# Patient Record
Sex: FEMALE | Race: WHITE | HISPANIC OR LATINO | Employment: FULL TIME | URBAN - METROPOLITAN AREA
[De-identification: names, ages, dates, MRNs, and addresses within clinical notes are randomized per-mention and may not be internally consistent; named-entity substitution may affect disease eponyms.]

---

## 2018-08-01 ENCOUNTER — OFFICE VISIT (OUTPATIENT)
Dept: URGENT CARE | Facility: PHYSICIAN GROUP | Age: 23
End: 2018-08-01
Payer: COMMERCIAL

## 2018-08-01 VITALS
WEIGHT: 129 LBS | SYSTOLIC BLOOD PRESSURE: 116 MMHG | TEMPERATURE: 97.2 F | DIASTOLIC BLOOD PRESSURE: 70 MMHG | OXYGEN SATURATION: 100 % | HEART RATE: 74 BPM | BODY MASS INDEX: 25.32 KG/M2 | HEIGHT: 60 IN

## 2018-08-01 DIAGNOSIS — H01.001 BLEPHARITIS OF RIGHT UPPER EYELID, UNSPECIFIED TYPE: ICD-10-CM

## 2018-08-01 PROCEDURE — 99204 OFFICE O/P NEW MOD 45 MIN: CPT | Performed by: FAMILY MEDICINE

## 2018-08-01 RX ORDER — POLYMYXIN B SULFATE AND TRIMETHOPRIM 1; 10000 MG/ML; [USP'U]/ML
1 SOLUTION OPHTHALMIC EVERY 4 HOURS
Qty: 10 ML | Refills: 0 | Status: SHIPPED | OUTPATIENT
Start: 2018-08-01 | End: 2018-10-09

## 2018-08-01 ASSESSMENT — ENCOUNTER SYMPTOMS
EYE PAIN: 0
CHILLS: 0
SHORTNESS OF BREATH: 0
MYALGIAS: 0
EYE REDNESS: 1
EYE DISCHARGE: 0
VOMITING: 0
DOUBLE VISION: 0
SORE THROAT: 0
FEVER: 0
DIZZINESS: 0
BLURRED VISION: 0
FOREIGN BODY SENSATION: 0
NAUSEA: 0
EYE ITCHING: 1

## 2018-08-01 NOTE — PROGRESS NOTES
Subjective:     Marian Dee is a 22 y.o. female who presents for Eye Problem (onset monday eyelid swelling)       Eye Problem    The right eye is affected. This is a new problem. The current episode started yesterday. The problem occurs constantly. The problem has been rapidly worsening. There was no injury mechanism. The pain is moderate. Associated symptoms include eye redness and itching. Pertinent negatives include no blurred vision, eye discharge, double vision, fever, foreign body sensation, nausea or vomiting.   History reviewed. No pertinent past medical history.History reviewed. No pertinent surgical history.  Social History     Social History   • Marital status:      Spouse name: N/A   • Number of children: N/A   • Years of education: N/A     Occupational History   • Not on file.     Social History Main Topics   • Smoking status: Never Smoker   • Smokeless tobacco: Never Used   • Alcohol use No   • Drug use: No   • Sexual activity: Yes     Partners: Male      Comment: no birth control     Other Topics Concern   • Not on file     Social History Narrative   • No narrative on file      Family History   Problem Relation Age of Onset   • Hyperlipidemia Maternal Grandmother    • Hyperlipidemia Paternal Grandmother    • Diabetes Paternal Grandmother    • Hyperlipidemia Paternal Grandfather    • Heart Disease Neg Hx    • Stroke Neg Hx     Review of Systems   Constitutional: Negative for chills and fever.   HENT: Negative for sore throat.    Eyes: Positive for redness and itching. Negative for blurred vision, double vision, pain and discharge.   Respiratory: Negative for shortness of breath.    Cardiovascular: Negative for chest pain.   Gastrointestinal: Negative for nausea and vomiting.   Genitourinary: Negative for hematuria.   Musculoskeletal: Negative for myalgias.   Skin: Negative for rash.   Neurological: Negative for dizziness.   No Known Allergies   Objective:   /70   Pulse  74   Temp 36.2 °C (97.2 °F)   Ht 1.524 m (5')   Wt 58.5 kg (129 lb)   SpO2 100%   BMI 25.19 kg/m²   Physical Exam   Constitutional: She is oriented to person, place, and time. She appears well-developed and well-nourished. No distress.   HENT:   Head: Normocephalic and atraumatic.   Eyes: Pupils are equal, round, and reactive to light. Conjunctivae and EOM are normal.   Erythematous and swollen upper eyelid right   Cardiovascular: Normal rate and regular rhythm.    No murmur heard.  Pulmonary/Chest: Effort normal and breath sounds normal. No respiratory distress.   Abdominal: Soft. She exhibits no distension. There is no tenderness.   Neurological: She is alert and oriented to person, place, and time. She has normal reflexes. No sensory deficit.   Skin: Skin is warm and dry.   Psychiatric: She has a normal mood and affect.         Assessment/Plan:   Assessment    1. Blepharitis of right upper eyelid, unspecified type  - polymixin-trimethoprim (POLYTRIM) 82762-0.1 UNIT/ML-% Solution; Place 1 Drop in both eyes every 4 hours.  Dispense: 10 mL; Refill: 0    Differential diagnosis, natural history, supportive care, and indications for immediate follow-up discussed.

## 2018-10-09 ENCOUNTER — OFFICE VISIT (OUTPATIENT)
Dept: MEDICAL GROUP | Facility: MEDICAL CENTER | Age: 23
End: 2018-10-09
Payer: COMMERCIAL

## 2018-10-09 VITALS
HEIGHT: 60 IN | RESPIRATION RATE: 16 BRPM | HEART RATE: 87 BPM | OXYGEN SATURATION: 97 % | DIASTOLIC BLOOD PRESSURE: 62 MMHG | WEIGHT: 132.2 LBS | SYSTOLIC BLOOD PRESSURE: 96 MMHG | TEMPERATURE: 97.9 F | BODY MASS INDEX: 25.95 KG/M2

## 2018-10-09 DIAGNOSIS — Z23 NEED FOR HPV VACCINATION: ICD-10-CM

## 2018-10-09 DIAGNOSIS — Z00.00 ROUTINE GENERAL MEDICAL EXAMINATION AT A HEALTH CARE FACILITY: ICD-10-CM

## 2018-10-09 DIAGNOSIS — Z00.00 LABORATORY EXAM ORDERED AS PART OF ROUTINE GENERAL MEDICAL EXAMINATION: ICD-10-CM

## 2018-10-09 PROCEDURE — 90471 IMMUNIZATION ADMIN: CPT | Performed by: FAMILY MEDICINE

## 2018-10-09 PROCEDURE — 90651 9VHPV VACCINE 2/3 DOSE IM: CPT | Performed by: FAMILY MEDICINE

## 2018-10-09 PROCEDURE — 99395 PREV VISIT EST AGE 18-39: CPT | Mod: 25 | Performed by: FAMILY MEDICINE

## 2018-10-09 ASSESSMENT — ENCOUNTER SYMPTOMS
CONSTIPATION: 0
NECK PAIN: 0
TREMORS: 0
FEVER: 0
PALPITATIONS: 0
ORTHOPNEA: 0
LOSS OF CONSCIOUSNESS: 0
WEIGHT LOSS: 0
BRUISES/BLEEDS EASILY: 0
DIARRHEA: 0
NAUSEA: 0
MYALGIAS: 0
TINGLING: 0
SORE THROAT: 0
SENSORY CHANGE: 0
CHILLS: 0
HEARTBURN: 0
VOMITING: 0
DEPRESSION: 0
SEIZURES: 0
COUGH: 0
INSOMNIA: 0
HALLUCINATIONS: 0
SPUTUM PRODUCTION: 0
MEMORY LOSS: 0
BACK PAIN: 0
FOCAL WEAKNESS: 0
BLURRED VISION: 0
BLOOD IN STOOL: 0
HEADACHES: 0
DIZZINESS: 0
SHORTNESS OF BREATH: 0
NERVOUS/ANXIOUS: 0
SPEECH CHANGE: 0
ABDOMINAL PAIN: 0
DOUBLE VISION: 0

## 2018-10-09 ASSESSMENT — LIFESTYLE VARIABLES: SUBSTANCE_ABUSE: 0

## 2018-10-10 NOTE — PROGRESS NOTES
Subjective:      Marian Dee is a 22 y.o. female who presents with New Patient and Annual Exam        She is here to establish care and for her annual examination.  Unable to perform pap smear today as she is on her period.    HPI     has a past medical history of History of hepatitis B. this was as a small child.   has no past surgical history on file.  family history includes Diabetes in her paternal grandmother; Hyperlipidemia in her maternal grandmother, paternal grandfather, and paternal grandmother.   reports that she has never smoked. She has never used smokeless tobacco. She reports that she does not drink alcohol or use drugs.    No current outpatient prescriptions on file.  has No Known Allergies.    Works as a lead .      Review of Systems   Constitutional: Negative for chills, fever, malaise/fatigue and weight loss.   HENT: Negative for congestion, hearing loss, sore throat and tinnitus.    Eyes: Negative for blurred vision and double vision.   Respiratory: Negative for cough, sputum production and shortness of breath.    Cardiovascular: Negative for chest pain, palpitations, orthopnea and leg swelling.   Gastrointestinal: Negative for abdominal pain, blood in stool, constipation, diarrhea, heartburn, nausea and vomiting.        History of gallbladder inflammation in pregnancy.     Genitourinary: Negative for dysuria, frequency, hematuria and urgency.        Cramping during her period.   Musculoskeletal: Negative for back pain, joint pain, myalgias and neck pain.   Skin: Negative for itching and rash.   Neurological: Negative for dizziness, tingling, tremors, sensory change, speech change, focal weakness, seizures, loss of consciousness and headaches.   Endo/Heme/Allergies: Negative for environmental allergies. Does not bruise/bleed easily.   Psychiatric/Behavioral: Negative for depression, hallucinations, memory loss, substance abuse and suicidal ideas. The patient is not  nervous/anxious and does not have insomnia.           Objective:     BP (!) 96/62 (BP Location: Right arm, Patient Position: Sitting, BP Cuff Size: Adult)   Pulse 87   Temp 36.6 °C (97.9 °F)   Resp 16   Ht 1.524 m (5')   Wt 60 kg (132 lb 3.2 oz)   SpO2 97%   BMI 25.82 kg/m²      Physical Exam   Constitutional: She is oriented to person, place, and time. She appears well-developed and well-nourished.   HENT:   Head: Normocephalic and atraumatic.   Mouth/Throat: Oropharynx is clear and moist.   Eyes: Pupils are equal, round, and reactive to light. EOM are normal. Left eye exhibits no discharge.   Neck: Normal range of motion. Neck supple. No JVD present. No thyromegaly present.   Cardiovascular: Normal rate, regular rhythm and normal heart sounds.    No murmur heard.  Pulmonary/Chest: Effort normal and breath sounds normal. No respiratory distress. She has no wheezes. She has no rales.   Abdominal: Soft. Bowel sounds are normal. She exhibits no distension and no mass. There is no tenderness.   Musculoskeletal: Normal range of motion. She exhibits no edema.   Lymphadenopathy:     She has no cervical adenopathy.   Neurological: She is alert and oriented to person, place, and time. Coordination normal.   Skin: Skin is warm and dry. No rash noted. No erythema.   Psychiatric: She has a normal mood and affect. Her behavior is normal.   Vitals reviewed.              Assessment/Plan:     1. Routine general medical examination at a health care facility  She is generally well.    2. Laboratory exam ordered as part of routine general medical examination  Routine orders discussed and placed  - COMP METABOLIC PANEL; Future  - LIPID PROFILE; Future  - CBC WITHOUT DIFFERENTIAL; Future    3. Need for HPV vaccination  #1 administered today  - 9VHPV Vaccine 2-3 Dose IM

## 2018-10-29 ENCOUNTER — HOSPITAL ENCOUNTER (OUTPATIENT)
Dept: LAB | Facility: MEDICAL CENTER | Age: 23
End: 2018-10-29
Attending: FAMILY MEDICINE
Payer: COMMERCIAL

## 2018-10-29 DIAGNOSIS — Z00.00 LABORATORY EXAM ORDERED AS PART OF ROUTINE GENERAL MEDICAL EXAMINATION: ICD-10-CM

## 2018-10-29 LAB
ALBUMIN SERPL BCP-MCNC: 4 G/DL (ref 3.2–4.9)
ALBUMIN/GLOB SERPL: 1.3 G/DL
ALP SERPL-CCNC: 46 U/L (ref 30–99)
ALT SERPL-CCNC: 13 U/L (ref 2–50)
ANION GAP SERPL CALC-SCNC: 3 MMOL/L (ref 0–11.9)
AST SERPL-CCNC: 16 U/L (ref 12–45)
BILIRUB SERPL-MCNC: 0.5 MG/DL (ref 0.1–1.5)
BUN SERPL-MCNC: 10 MG/DL (ref 8–22)
CALCIUM SERPL-MCNC: 9.3 MG/DL (ref 8.5–10.5)
CHLORIDE SERPL-SCNC: 108 MMOL/L (ref 96–112)
CHOLEST SERPL-MCNC: 137 MG/DL (ref 100–199)
CO2 SERPL-SCNC: 26 MMOL/L (ref 20–33)
CREAT SERPL-MCNC: 0.54 MG/DL (ref 0.5–1.4)
ERYTHROCYTE [DISTWIDTH] IN BLOOD BY AUTOMATED COUNT: 42.9 FL (ref 35.9–50)
FASTING STATUS PATIENT QL REPORTED: NORMAL
GLOBULIN SER CALC-MCNC: 3.1 G/DL (ref 1.9–3.5)
GLUCOSE SERPL-MCNC: 89 MG/DL (ref 65–99)
HCT VFR BLD AUTO: 42.6 % (ref 37–47)
HDLC SERPL-MCNC: 37 MG/DL
HGB BLD-MCNC: 14.4 G/DL (ref 12–16)
LDLC SERPL CALC-MCNC: 88 MG/DL
MCH RBC QN AUTO: 31.6 PG (ref 27–33)
MCHC RBC AUTO-ENTMCNC: 33.8 G/DL (ref 33.6–35)
MCV RBC AUTO: 93.6 FL (ref 81.4–97.8)
PLATELET # BLD AUTO: 270 K/UL (ref 164–446)
PMV BLD AUTO: 10.7 FL (ref 9–12.9)
POTASSIUM SERPL-SCNC: 4.1 MMOL/L (ref 3.6–5.5)
PROT SERPL-MCNC: 7.1 G/DL (ref 6–8.2)
RBC # BLD AUTO: 4.55 M/UL (ref 4.2–5.4)
SODIUM SERPL-SCNC: 137 MMOL/L (ref 135–145)
TRIGL SERPL-MCNC: 61 MG/DL (ref 0–149)
WBC # BLD AUTO: 9 K/UL (ref 4.8–10.8)

## 2018-10-29 PROCEDURE — 36415 COLL VENOUS BLD VENIPUNCTURE: CPT

## 2018-10-29 PROCEDURE — 85027 COMPLETE CBC AUTOMATED: CPT

## 2018-10-29 PROCEDURE — 80061 LIPID PANEL: CPT

## 2018-10-29 PROCEDURE — 80053 COMPREHEN METABOLIC PANEL: CPT

## 2018-12-17 DIAGNOSIS — N92.6 IRREGULAR MENSES: ICD-10-CM

## 2018-12-20 ENCOUNTER — HOSPITAL ENCOUNTER (OUTPATIENT)
Dept: LAB | Facility: MEDICAL CENTER | Age: 23
End: 2018-12-20
Attending: FAMILY MEDICINE
Payer: COMMERCIAL

## 2018-12-20 DIAGNOSIS — N92.6 IRREGULAR MENSES: ICD-10-CM

## 2018-12-20 LAB — HCG SERPL QL: NEGATIVE

## 2018-12-20 PROCEDURE — 36415 COLL VENOUS BLD VENIPUNCTURE: CPT

## 2018-12-20 PROCEDURE — 84703 CHORIONIC GONADOTROPIN ASSAY: CPT

## 2018-12-21 ENCOUNTER — TELEPHONE (OUTPATIENT)
Dept: MEDICAL GROUP | Facility: MEDICAL CENTER | Age: 23
End: 2018-12-21

## 2019-01-03 ENCOUNTER — OFFICE VISIT (OUTPATIENT)
Dept: URGENT CARE | Facility: CLINIC | Age: 24
End: 2019-01-03
Payer: COMMERCIAL

## 2019-01-03 VITALS
RESPIRATION RATE: 16 BRPM | OXYGEN SATURATION: 96 % | WEIGHT: 132 LBS | TEMPERATURE: 34 F | SYSTOLIC BLOOD PRESSURE: 104 MMHG | DIASTOLIC BLOOD PRESSURE: 60 MMHG | HEART RATE: 135 BPM | BODY MASS INDEX: 25.91 KG/M2 | HEIGHT: 60 IN

## 2019-01-03 DIAGNOSIS — J02.0 STREP THROAT: Primary | ICD-10-CM

## 2019-01-03 LAB
APPEARANCE UR: NORMAL
BILIRUB UR STRIP-MCNC: NORMAL MG/DL
COLOR UR AUTO: NORMAL
GLUCOSE UR STRIP.AUTO-MCNC: NORMAL MG/DL
INT CON NEG: NORMAL
INT CON NEG: NORMAL
INT CON POS: NORMAL
INT CON POS: NORMAL
KETONES UR STRIP.AUTO-MCNC: NORMAL MG/DL
LEUKOCYTE ESTERASE UR QL STRIP.AUTO: NORMAL
NITRITE UR QL STRIP.AUTO: 1
PH UR STRIP.AUTO: NORMAL [PH] (ref 5–8)
POC URINE PREGNANCY TEST: NORMAL
PROT UR QL STRIP: 6.5 MG/DL
RBC UR QL AUTO: 1.02
S PYO AG THROAT QL: NORMAL
SP GR UR STRIP.AUTO: 15
UROBILINOGEN UR STRIP-MCNC: 30 MG/DL

## 2019-01-03 PROCEDURE — 81002 URINALYSIS NONAUTO W/O SCOPE: CPT | Performed by: PHYSICIAN ASSISTANT

## 2019-01-03 PROCEDURE — 99214 OFFICE O/P EST MOD 30 MIN: CPT | Performed by: PHYSICIAN ASSISTANT

## 2019-01-03 PROCEDURE — 87880 STREP A ASSAY W/OPTIC: CPT | Performed by: PHYSICIAN ASSISTANT

## 2019-01-03 PROCEDURE — 81025 URINE PREGNANCY TEST: CPT | Performed by: PHYSICIAN ASSISTANT

## 2019-01-03 RX ORDER — AMOXICILLIN AND CLAVULANATE POTASSIUM 875; 125 MG/1; MG/1
1 TABLET, FILM COATED ORAL 2 TIMES DAILY
Qty: 14 TAB | Refills: 0 | Status: SHIPPED | OUTPATIENT
Start: 2019-01-03 | End: 2019-01-10

## 2019-01-03 NOTE — LETTER
January 3, 2019       Patient: Marian Dee   YOB: 1995   Date of Visit: 1/3/2019         To Whom It May Concern:    It is my medical opinion that Marian Dunaway may be excused from work for the dates of 1/3/19-1/4/19.    If you have any questions or concerns, please don't hesitate to call [phone]          Sincerely,          Helio Dumont P.A.-C.  Electronically Signed

## 2019-01-04 NOTE — PROGRESS NOTES
Subjective:      Pt is a 23 y.o. female who presents with Cough (x3 days, green mucus, and sore throat )            HPI  This is a new problem. PT presents to  clinic today complaining of sore throat, fevers, chills, body aches, watery eyes, pressure in ears, cough, fatigue, runny nose. Pt notes spotting for her menses this last week and is concerned for pregnancy even though recent blood test was NEG. PT denies CP, SOB, NVD, abdominal pain, joint pain. PT states these symptoms began around 3 days ago. PT states the pain is a 6/10 with swallowing, aching in nature and worse at night.  Pt has not taken any RX medications for this condition. The pt's medication list, problem list, and allergies have been evaluated and reviewed during today's visit.      PMH:  Past Medical History:   Diagnosis Date   • History of hepatitis B     in childhood       PSH:  Negative per pt.      Fam Hx:    family history includes Diabetes in her paternal grandmother; Hyperlipidemia in her maternal grandmother, paternal grandfather, and paternal grandmother.  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    ROS  Constitutional: Positive for chills, fevers, body aches and malaise/fatigue.   HENT: Positive for congestion and sore throat. Negative for ear pain.    Eyes: Negative for blurred vision, double vision and photophobia.   Respiratory: Positive for cough and sputum production. Negative for hemoptysis, shortness of breath and wheezing.    Cardiovascular: Negative for chest pain and palpitations.   Gastrointestinal: Negative for nausea, vomiting, abdominal pain, diarrhea and constipation.   Genitourinary: Negative for dysuria and flank pain.   Musculoskeletal: Negative for falls and myalgias.   Skin: Negative for itching and rash.   Neurological: Positive for headaches. Negative for dizziness and tingling.   Endo/Heme/Allergies: Does not bruise/bleed easily.   Psychiatric/Behavioral: Negative for depression. The patient is not nervous/anxious.             Objective:     /60   Pulse (!) 135   Temp (!) 1.1 °C (34 °F)   Resp 16   Ht 1.524 m (5')   Wt 59.9 kg (132 lb)   SpO2 96%   BMI 25.78 kg/m²      Physical Exam      Constitutional: PT is oriented to person, place, and time. PT appears well-developed and well-nourished. No distress.   HENT:   Head: Normocephalic and atraumatic.   Right Ear: Hearing, tympanic membrane, external ear and ear canal normal.   Left Ear: Hearing, tympanic membrane, external ear and ear canal normal.   Nose: Mucosal edema, rhinorrhea and sinus tenderness present. Right sinus exhibits frontal sinus tenderness. Left sinus exhibits frontal sinus tenderness.   Mouth/Throat: Uvula is midline. Mucous membranes are pale. Posterior oropharyngeal edema and posterior oropharyngeal erythema with exudate noted on exam.   Eyes: Conjunctivae normal and EOM are normal. Pupils are equal, round, and reactive to light.   Neck: Normal range of motion. Neck supple. No thyromegaly present.   Cardiovascular: Normal rate, regular rhythm, normal heart sounds and intact distal  pulses.  Exam reveals no gallop and no friction rub.    No murmur heard.  Pulmonary/Chest: Effort normal and breath sounds normal. No respiratory distress. PT has no wheezes. PT has no rales. PT exhibits no tenderness.   Abdominal: Soft. Bowel sounds are normal. PT exhibits no distension and no mass. There is no tenderness. There is no rebound and no guarding.   Musculoskeletal: Normal range of motion. PT exhibits no edema and no tenderness.   Lymphadenopathy:     PT has no cervical adenopathy.   Neurological: PT is alert and oriented to person, place, and time. PT displays normal reflexes. No cranial nerve deficit. PT exhibits normal muscle tone. Coordination normal.   Skin: Skin is warm and dry. No rash noted. No erythema.   Psychiatric: PT has a normal mood and affect. PT behavior is normal. Judgment and thought content normal.          Assessment/Plan:     1. Strep throat    - POCT Rapid Strep A-->POS  - POCT Urinalysis-->WNL  - POCT Pregnancy-->NEG  - amoxicillin-clavulanate (AUGMENTIN) 875-125 MG Tab; Take 1 Tab by mouth 2 times a day for 7 days.  Dispense: 14 Tab; Refill: 0    Rest, fluids encouraged.  OTC decongestant for congestion/cough  Note given for work.  AVS with medical info given.  Pt was in full understanding and agreement with the plan.  Follow-up as needed if symptoms worsen or fail to improve.

## 2019-01-08 ENCOUNTER — HOSPITAL ENCOUNTER (OUTPATIENT)
Facility: MEDICAL CENTER | Age: 24
End: 2019-01-08
Attending: FAMILY MEDICINE
Payer: COMMERCIAL

## 2019-01-08 ENCOUNTER — OFFICE VISIT (OUTPATIENT)
Dept: MEDICAL GROUP | Facility: MEDICAL CENTER | Age: 24
End: 2019-01-08
Payer: COMMERCIAL

## 2019-01-08 VITALS
HEART RATE: 82 BPM | RESPIRATION RATE: 16 BRPM | DIASTOLIC BLOOD PRESSURE: 70 MMHG | BODY MASS INDEX: 25.52 KG/M2 | OXYGEN SATURATION: 98 % | HEIGHT: 60 IN | SYSTOLIC BLOOD PRESSURE: 110 MMHG | TEMPERATURE: 97.5 F | WEIGHT: 130 LBS

## 2019-01-08 DIAGNOSIS — Z23 NEED FOR HPV VACCINATION: ICD-10-CM

## 2019-01-08 DIAGNOSIS — Z01.419 WELL WOMAN EXAM WITH ROUTINE GYNECOLOGICAL EXAM: ICD-10-CM

## 2019-01-08 DIAGNOSIS — Z23 NEED FOR IMMUNIZATION AGAINST INFLUENZA: ICD-10-CM

## 2019-01-08 PROCEDURE — 90686 IIV4 VACC NO PRSV 0.5 ML IM: CPT | Performed by: FAMILY MEDICINE

## 2019-01-08 PROCEDURE — 90471 IMMUNIZATION ADMIN: CPT | Performed by: FAMILY MEDICINE

## 2019-01-08 PROCEDURE — 99395 PREV VISIT EST AGE 18-39: CPT | Mod: 25 | Performed by: FAMILY MEDICINE

## 2019-01-08 PROCEDURE — 88175 CYTOPATH C/V AUTO FLUID REDO: CPT

## 2019-01-08 PROCEDURE — 87591 N.GONORRHOEAE DNA AMP PROB: CPT

## 2019-01-08 PROCEDURE — 87491 CHLMYD TRACH DNA AMP PROBE: CPT

## 2019-01-08 PROCEDURE — 99000 SPECIMEN HANDLING OFFICE-LAB: CPT | Performed by: FAMILY MEDICINE

## 2019-01-08 PROCEDURE — 87624 HPV HI-RISK TYP POOLED RSLT: CPT

## 2019-01-08 ASSESSMENT — ENCOUNTER SYMPTOMS
TINGLING: 0
WEIGHT LOSS: 0
VOMITING: 0
DEPRESSION: 0
FEVER: 0
PALPITATIONS: 0
SORE THROAT: 0
TREMORS: 0
BRUISES/BLEEDS EASILY: 0
HALLUCINATIONS: 0
BLOOD IN STOOL: 0
DIZZINESS: 0
MYALGIAS: 0
SPUTUM PRODUCTION: 0
HEADACHES: 0
BLURRED VISION: 0
COUGH: 0
SPEECH CHANGE: 0
LOSS OF CONSCIOUSNESS: 0
HEARTBURN: 0
CHILLS: 0
DIARRHEA: 0
FOCAL WEAKNESS: 0
NECK PAIN: 0
SEIZURES: 0
ABDOMINAL PAIN: 0
BACK PAIN: 0
NAUSEA: 1
NERVOUS/ANXIOUS: 0
DOUBLE VISION: 0
WHEEZING: 0
DIAPHORESIS: 0
INSOMNIA: 0
MEMORY LOSS: 0
SENSORY CHANGE: 0
SHORTNESS OF BREATH: 0
ORTHOPNEA: 0

## 2019-01-08 ASSESSMENT — LIFESTYLE VARIABLES: SUBSTANCE_ABUSE: 0

## 2019-01-09 LAB — AMBIGUOUS DTTM AMBI4: NORMAL

## 2019-01-09 NOTE — PROGRESS NOTES
Subjective:      Marian Dee is a 23 y.o. female who presents with Annual Exam        She is here for her well woman examination with pap    HPI     has a past medical history of History of hepatitis B.   has no past surgical history on file.  family history includes Diabetes in her paternal grandmother; Hyperlipidemia in her maternal grandmother, paternal grandfather, and paternal grandmother.   reports that she has never smoked. She has never used smokeless tobacco. She reports that she does not drink alcohol or use drugs.      Current Outpatient Prescriptions:   •  amoxicillin-clavulanate (AUGMENTIN) 875-125 MG Tab, Take 1 Tab by mouth 2 times a day for 7 days., Disp: 14 Tab, Rfl: 0  has No Known Allergies.  .  Review of Systems   Constitutional: Negative for chills, diaphoresis, fever, malaise/fatigue and weight loss.   HENT: Negative for congestion, hearing loss, sore throat and tinnitus.    Eyes: Negative for blurred vision and double vision.   Respiratory: Negative for cough, sputum production, shortness of breath and wheezing.    Cardiovascular: Negative for chest pain, palpitations, orthopnea and leg swelling.   Gastrointestinal: Positive for nausea. Negative for abdominal pain, blood in stool, diarrhea, heartburn and vomiting.   Genitourinary: Negative for dysuria, frequency, hematuria and urgency.   Musculoskeletal: Negative for back pain, joint pain, myalgias and neck pain.   Skin: Negative for rash.   Neurological: Negative for dizziness, tingling, tremors, sensory change, speech change, focal weakness, seizures, loss of consciousness and headaches.   Endo/Heme/Allergies: Negative for environmental allergies. Does not bruise/bleed easily.   Psychiatric/Behavioral: Negative for depression, hallucinations, memory loss, substance abuse and suicidal ideas. The patient is not nervous/anxious and does not have insomnia.           Objective:     /70   Pulse 82   Temp 36.4 °C (97.5 °F)    Resp 16   Ht 1.524 m (5')   Wt 59 kg (130 lb)   SpO2 98%   BMI 25.39 kg/m²      Physical Exam   Constitutional: She is oriented to person, place, and time. She appears well-developed and well-nourished. No distress.   HENT:   Head: Normocephalic and atraumatic.   Mouth/Throat: Oropharynx is clear and moist.   Eyes: Pupils are equal, round, and reactive to light. EOM are normal. Left eye exhibits no discharge.   Neck: Normal range of motion. Neck supple. No JVD present. No thyromegaly present.   Cardiovascular: Normal rate, regular rhythm and normal heart sounds.    No murmur heard.  Pulmonary/Chest: Effort normal and breath sounds normal. No respiratory distress. She has no wheezes. She has no rales. Right breast exhibits no inverted nipple, no mass, no nipple discharge, no skin change and no tenderness. Left breast exhibits no inverted nipple, no mass, no nipple discharge, no skin change and no tenderness. Breasts are symmetrical.   Abdominal: Soft. Bowel sounds are normal. She exhibits no distension and no mass. There is no tenderness.   Genitourinary: Vagina normal and uterus normal. No breast tenderness, discharge or bleeding. Cervix exhibits no discharge. Right adnexum displays no mass. Left adnexum displays no mass. No vaginal discharge found.   Genitourinary Comments: Pap smear (brush and spatula) taken and sent   Musculoskeletal: Normal range of motion. She exhibits no edema.   Lymphadenopathy:     She has no cervical adenopathy.   Neurological: She is alert and oriented to person, place, and time. Coordination normal.   Skin: Skin is warm and dry. No rash noted. No erythema.   Psychiatric: She has a normal mood and affect. Her behavior is normal.   Vitals reviewed.       Labs from October were reviewed and discussed, excellent results.       Assessment/Plan:     1. Well woman exam with routine gynecological exam  She is generally well.  Await Pap results.  They are hoping to get pregnant again  soon.  - THINPREP PAP W/HPV AND CTNG; Future    2. Need for immunization against influenza  Administered today  - Influenza Vaccine Quad Injection >3Y (PF)    3. Need for HPV vaccination  We are temporarily out.  Patient will come back at her convenience for the immunization.

## 2019-01-10 LAB
C TRACH DNA GENITAL QL NAA+PROBE: NEGATIVE
CYTOLOGY REG CYTOL: NORMAL
HPV HR 12 DNA CVX QL NAA+PROBE: NEGATIVE
HPV16 DNA SPEC QL NAA+PROBE: NEGATIVE
HPV18 DNA SPEC QL NAA+PROBE: NEGATIVE
N GONORRHOEA DNA GENITAL QL NAA+PROBE: NEGATIVE
SPECIMEN SOURCE: NORMAL
SPECIMEN SOURCE: NORMAL

## 2019-01-18 ENCOUNTER — TELEPHONE (OUTPATIENT)
Dept: MEDICAL GROUP | Facility: MEDICAL CENTER | Age: 24
End: 2019-01-18

## 2019-01-18 ENCOUNTER — NON-PROVIDER VISIT (OUTPATIENT)
Dept: MEDICAL GROUP | Facility: MEDICAL CENTER | Age: 24
End: 2019-01-18
Payer: COMMERCIAL

## 2019-01-18 DIAGNOSIS — Z23 NEED FOR HPV VACCINATION: ICD-10-CM

## 2019-01-18 PROCEDURE — 90651 9VHPV VACCINE 2/3 DOSE IM: CPT | Performed by: FAMILY MEDICINE

## 2019-01-18 PROCEDURE — 90471 IMMUNIZATION ADMIN: CPT | Performed by: FAMILY MEDICINE

## 2019-01-18 NOTE — PROGRESS NOTES
Marian Dee is a 23 y.o. female here for a non-provider visit for HPV injection.    Reason for injection: HPV  Order in MAR?: Yes  Patient supplied?:No  Minimum interval has been met for this injection (per MAR order): Yes    Order and dose verified by: CD  Patient tolerated injection and no adverse effects were observed or reported: Yes

## 2019-01-27 DIAGNOSIS — Z32.01 POSITIVE URINE PREGNANCY TEST: ICD-10-CM

## 2019-01-27 DIAGNOSIS — N91.0 DELAYED MENSES: ICD-10-CM

## 2019-01-30 ENCOUNTER — HOSPITAL ENCOUNTER (OUTPATIENT)
Dept: LAB | Facility: MEDICAL CENTER | Age: 24
End: 2019-01-30
Attending: FAMILY MEDICINE
Payer: COMMERCIAL

## 2019-01-30 DIAGNOSIS — Z32.01 POSITIVE URINE PREGNANCY TEST: ICD-10-CM

## 2019-01-30 DIAGNOSIS — N91.0 DELAYED MENSES: ICD-10-CM

## 2019-01-30 LAB — HCG SERPL QL: POSITIVE

## 2019-01-30 PROCEDURE — 36415 COLL VENOUS BLD VENIPUNCTURE: CPT

## 2019-01-30 PROCEDURE — 84703 CHORIONIC GONADOTROPIN ASSAY: CPT

## 2019-02-01 DIAGNOSIS — Z3A.01 LESS THAN 8 WEEKS GESTATION OF PREGNANCY: ICD-10-CM

## 2019-02-01 DIAGNOSIS — Z32.01 POSITIVE BLOOD PREGNANCY TEST: ICD-10-CM

## 2019-02-04 DIAGNOSIS — Z32.01 POSITIVE BLOOD PREGNANCY TEST: ICD-10-CM

## 2019-02-22 ENCOUNTER — GYNECOLOGY VISIT (OUTPATIENT)
Dept: OBGYN | Facility: CLINIC | Age: 24
End: 2019-02-22
Payer: COMMERCIAL

## 2019-02-22 VITALS
HEIGHT: 60 IN | DIASTOLIC BLOOD PRESSURE: 84 MMHG | WEIGHT: 132 LBS | BODY MASS INDEX: 25.91 KG/M2 | SYSTOLIC BLOOD PRESSURE: 124 MMHG

## 2019-02-22 DIAGNOSIS — Z32.01 PREGNANCY TEST-POSITIVE: ICD-10-CM

## 2019-02-22 DIAGNOSIS — N92.6 MISSED MENSES: ICD-10-CM

## 2019-02-22 PROCEDURE — 76830 TRANSVAGINAL US NON-OB: CPT | Performed by: OBSTETRICS & GYNECOLOGY

## 2019-02-22 PROCEDURE — 99204 OFFICE O/P NEW MOD 45 MIN: CPT | Mod: 25 | Performed by: OBSTETRICS & GYNECOLOGY

## 2019-02-22 NOTE — PROGRESS NOTES
CC: Missed menses    Marian Dee is a 23 y.o.  who presents presents due to missed menses. Unsure LMP - sometime in December but this was lighter than her typical period. Reports she first had a positive pregnancy test on in the middle of January. She was not using anything for birthcontrol.  This is a planned and desired pregnancy.   Reports she has been feeling somewhat nauseous and tired thus far in pregnancy. She denies vomiting, denies headache, denies dysuria, denies  vaginal bleeding/spotting, and denies contractions/cramping.    Partner: Abdiel    Review of systems:  Pertinent positives documented in HPI and all other systems reviewed & are negative    GYN History:  Menarche @11.  Menses regular, lasting 7 days, not particularly heavy or painful.  Last pap 19 WNL,  no h/o abnormal pap.  No history of cone biopsy, LEEP or any other cervical, uterine or gynecologic surgery. No history of sexually transmitted diseases.  Currently sexually active with .  Utilizing nothing for contraception and has used condoms in the past.     OB History:    OB History    Para Term  AB Living   2 1 1   1 1   SAB TAB Ectopic Molar Multiple Live Births     1       1      # Outcome Date GA Lbr Janes/2nd Weight Sex Delivery Anes PTL Lv   2 Term 09/20/15 40w0d  3.164 kg (6 lb 15.6 oz) M Vag-Spont None N ISAIAH   1 TAB 10/2013 12w0d                 All PMH, PSH, allergies, social history and FH reviewed and updated today:  Past Medical History:  Past Medical History:   Diagnosis Date   • History of hepatitis B     in childhood     Past Surgical History:  No past surgical history on file.    Medications:   No current [facility]-ordered outpatient prescriptions on file.     No current [facility]-ordered facility-administered medications on file.    Prenatal vitamin    Allergies: Patient has no known allergies.    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     Social History Main Topics   • Smoking status: Never Smoker   • Smokeless tobacco: Never Used   • Alcohol use No   • Drug use: No   • Sexual activity: Yes     Partners: Male      Comment: no birth control     Other Topics Concern   • Not on file     Social History Narrative   • No narrative on file       Family History:  Family History   Problem Relation Age of Onset   • Hyperlipidemia Maternal Grandmother    • Hyperlipidemia Paternal Grandmother    • Diabetes Paternal Grandmother    • Hyperlipidemia Paternal Grandfather    • Heart Disease Neg Hx    • Stroke Neg Hx         Objective:   Vitals:  Blood pressure 124/84, height 1.524 m (5'), weight 59.9 kg (132 lb), not currently breastfeeding.  Body mass index is 25.78 kg/m². (Goal BM I>18 <25)  Exam:  General: appears stated age  Head: normocephalic, non-tender  Neck: neck is supple, there is full range of motion  Abdomen: Bowel sounds positive, nondistended, soft, nontender x4, no rebound or guarding. No organomegaly. No masses. fundus not yet palpable  Female GYN: normal female external genitalia without lesions  Skin: No rashes, or ulcers or lesions seen  Psychiatric: appropriate affect, alert and oriented x3, intact judgment and insight.    Procedure:  Transvaginal US performed by me and per my read:    Indication: confirm IUP/dating.     Findings: arana intrauterine pregnancy @ 10w1d by CRL. Positive yolk sac. Positive fetal cardiac activity @ 153 BPM. Right ovary WNl. Left Ovary WNL. Cervical length 4cm. No free fluid in the cul-de-sac.    Impression: viable IUP @ 10w1d.  BRIANNA by US of 19.    No results found for this or any previous visit (from the past 336 hour(s)).   Pregnancy exam/test positive    A/P:   23 y.o.  here for  1. Pregnancy test-positive     2. Missed menses       #Missed menses -  amenorrhea due to pregnancy.  US today dates pregnancy as patient is unsure of LMP, BRIANNA of 9/19/19.  Discussed pregnancy with patient who is excited.    --Normal pregnancy s/s discussed  --Advised prenatal vitamins, healthy well rounded diet, adequate hydration, and continued exercise.    #Cervical cancer screening.  Last pap this year WNL.    #Will order prenatal labs and any additional imaging/testing needed at new OB visit  #SAB precautions discussed.  #Follow up in 2-4 weeks for new OB visit.    45 minutes were spent in face-to-face patient contact with patient, greater than 50% of which was was spent in counseling and coordination of care for her newly diagnosed pregnancy including medical and surgical options of care.

## 2019-02-22 NOTE — NON-PROVIDER
Pt here for a DUB  LMP: pt not sure  Pharmacy verified  Pt denies vaginal bleeding or pelvic pain

## 2019-03-22 ENCOUNTER — APPOINTMENT (OUTPATIENT)
Dept: OBGYN | Facility: CLINIC | Age: 24
End: 2019-03-22
Payer: COMMERCIAL

## 2019-03-22 ENCOUNTER — INITIAL PRENATAL (OUTPATIENT)
Dept: OBGYN | Facility: CLINIC | Age: 24
End: 2019-03-22
Payer: COMMERCIAL

## 2019-03-22 VITALS — SYSTOLIC BLOOD PRESSURE: 110 MMHG | WEIGHT: 131 LBS | BODY MASS INDEX: 25.58 KG/M2 | DIASTOLIC BLOOD PRESSURE: 68 MMHG

## 2019-03-22 DIAGNOSIS — Z34.82 ENCOUNTER FOR SUPERVISION OF OTHER NORMAL PREGNANCY IN SECOND TRIMESTER: ICD-10-CM

## 2019-03-22 PROCEDURE — 90040 PR PRENATAL FOLLOW UP: CPT | Performed by: ADVANCED PRACTICE MIDWIFE

## 2019-03-22 RX ORDER — ONDANSETRON 4 MG/1
4 TABLET, FILM COATED ORAL EVERY 6 HOURS PRN
Qty: 30 TAB | Refills: 1 | Status: SHIPPED | OUTPATIENT
Start: 2019-03-22 | End: 2019-04-21

## 2019-03-22 NOTE — PROGRESS NOTES
Subjective:   Marian Dee is a 23 y.o.  who presents for her new OB exam.  She is 14w1d with an BRIANNA of Estimated Date of Delivery: 19 by US. She is feeling well and has no concerns at this time. Denies VB, LOF, contractions or pain. No ER visits or previous care in this pregnancy. Denies dysuria, vaginal DC, fever. Reports some fetal movement. Desires AFP  Declines CF.  Is having nausea in morning or randomly during the day. No vomiting. Eating small amounts.     Past Medical History:   Diagnosis Date   • History of hepatitis B     in childhood       Psych Hx: Patient denies any history of depression, anxiety, PTSD, bipolar or any other psychological issues.     History reviewed. No pertinent surgical history.     OB History    Para Term  AB Living   3 1 1   1 1   SAB TAB Ectopic Molar Multiple Live Births     1       1      # Outcome Date GA Lbr Janes/2nd Weight Sex Delivery Anes PTL Lv   3 Current            2 Term 09/20/15 40w0d  3.164 kg (6 lb 15.6 oz) M Vag-Spont None N ISAIAH   1 TAB 10/2013 12w0d                  Gynecological Hx: Denies any hx of STIs, including HSV. Denies any vulvovaginal disorders and no hx of abnormal cervical cytology. Last pap 2019    Sexual Hx: One current male partner, who is FOB  (Niraj)    Contraceptive Hx: Has used condoms in the past and has since discontinued use.     Family History   Problem Relation Age of Onset   • No Known Problems Mother    • No Known Problems Father    • Hyperlipidemia Maternal Grandmother    • Hyperlipidemia Paternal Grandmother    • Diabetes Paternal Grandmother    • Hyperlipidemia Paternal Grandfather    • Heart Disease Neg Hx    • Stroke Neg Hx      Denies any genetic disorders in family history.     Social History     Social History   • Marital status:      Spouse name: N/A   • Number of children: N/A   • Years of education: N/A     Occupational History   •       works in [employer]      Social History Main Topics   • Smoking status: Never Smoker   • Smokeless tobacco: Never Used   • Alcohol use No   • Drug use: No   • Sexual activity: Yes     Partners: Male      Comment: no birth control     Other Topics Concern   • Not on file     Social History Narrative   • No narrative on file       FOB is involved and lives with Marian Dee.  Pregnancy is planned but desired.    She is currently  working [employer] support , denies any heavy lifting or exposure to potential teratogens like environmental or occupational toxins.   Denies alcohol use, drug use, or tobacco use in pregnancy.   Denies any current or hx of sexual, emotional or physical abuse or trauma.     Current Medications: PNV   Allergies: Denies allergies to medications, food, or environmental allergies    Objective:      Vitals:    03/22/19 1348   BP: 110/68   Weight: 59.4 kg (131 lb)        See Prenatal Physical and Prenatal Vitals  UA WNL today      Assessment:      1.  IUP @ 14w1d per US      2.  S=D      3.  See problem list as follows       Patient Active Problem List    Diagnosis Date Noted   • Positive blood pregnancy test 02/04/2019         Plan:   -  GC/CT & pap done 1/2019  - Prenatal labs ordered - lab slip provided  - Discussed PNV, nutrition, adequate water intake, and exercise/weight gain in pregnancy  - NOB informational packet with anticipatory guidance given  - S/sx of pregnancy warning signs and PTL precautions given  - Complete OB US in 6 wks  - Return to clinic in 4 weeks.

## 2019-04-08 ENCOUNTER — HOSPITAL ENCOUNTER (OUTPATIENT)
Dept: LAB | Facility: MEDICAL CENTER | Age: 24
End: 2019-04-08
Attending: ADVANCED PRACTICE MIDWIFE
Payer: COMMERCIAL

## 2019-04-08 DIAGNOSIS — Z34.82 ENCOUNTER FOR SUPERVISION OF OTHER NORMAL PREGNANCY IN SECOND TRIMESTER: ICD-10-CM

## 2019-04-08 LAB
ABO GROUP BLD: NORMAL
APPEARANCE UR: ABNORMAL
BACTERIA #/AREA URNS HPF: ABNORMAL /HPF
BASOPHILS # BLD AUTO: 0.3 % (ref 0–1.8)
BASOPHILS # BLD: 0.04 K/UL (ref 0–0.12)
BILIRUB UR QL STRIP.AUTO: NEGATIVE
BLD GP AB SCN SERPL QL: NORMAL
COLOR UR: YELLOW
EOSINOPHIL # BLD AUTO: 0.21 K/UL (ref 0–0.51)
EOSINOPHIL NFR BLD: 1.7 % (ref 0–6.9)
EPI CELLS #/AREA URNS HPF: ABNORMAL /HPF
ERYTHROCYTE [DISTWIDTH] IN BLOOD BY AUTOMATED COUNT: 43.1 FL (ref 35.9–50)
GLUCOSE UR STRIP.AUTO-MCNC: NEGATIVE MG/DL
HBV SURFACE AG SER QL: NEGATIVE
HCT VFR BLD AUTO: 39.4 % (ref 37–47)
HGB BLD-MCNC: 13.2 G/DL (ref 12–16)
HIV 1+2 AB+HIV1 P24 AG SERPL QL IA: NON REACTIVE
HYALINE CASTS #/AREA URNS LPF: ABNORMAL /LPF
IMM GRANULOCYTES # BLD AUTO: 0.15 K/UL (ref 0–0.11)
IMM GRANULOCYTES NFR BLD AUTO: 1.2 % (ref 0–0.9)
KETONES UR STRIP.AUTO-MCNC: NEGATIVE MG/DL
LEUKOCYTE ESTERASE UR QL STRIP.AUTO: NEGATIVE
LYMPHOCYTES # BLD AUTO: 2.6 K/UL (ref 1–4.8)
LYMPHOCYTES NFR BLD: 20.7 % (ref 22–41)
MCH RBC QN AUTO: 31.4 PG (ref 27–33)
MCHC RBC AUTO-ENTMCNC: 33.5 G/DL (ref 33.6–35)
MCV RBC AUTO: 93.8 FL (ref 81.4–97.8)
MICRO URNS: ABNORMAL
MONOCYTES # BLD AUTO: 1.02 K/UL (ref 0–0.85)
MONOCYTES NFR BLD AUTO: 8.1 % (ref 0–13.4)
NEUTROPHILS # BLD AUTO: 8.57 K/UL (ref 2–7.15)
NEUTROPHILS NFR BLD: 68 % (ref 44–72)
NITRITE UR QL STRIP.AUTO: NEGATIVE
NRBC # BLD AUTO: 0 K/UL
NRBC BLD-RTO: 0 /100 WBC
PH UR STRIP.AUTO: 6.5 [PH]
PLATELET # BLD AUTO: 237 K/UL (ref 164–446)
PMV BLD AUTO: 10.7 FL (ref 9–12.9)
PROT UR QL STRIP: NEGATIVE MG/DL
RBC # BLD AUTO: 4.2 M/UL (ref 4.2–5.4)
RBC # URNS HPF: ABNORMAL /HPF
RBC UR QL AUTO: NEGATIVE
RH BLD: NORMAL
RUBV AB SER QL: 40.2 IU/ML
SP GR UR STRIP.AUTO: 1.03
TREPONEMA PALLIDUM IGG+IGM AB [PRESENCE] IN SERUM OR PLASMA BY IMMUNOASSAY: NON REACTIVE
UROBILINOGEN UR STRIP.AUTO-MCNC: 1 MG/DL
WBC # BLD AUTO: 12.6 K/UL (ref 4.8–10.8)
WBC #/AREA URNS HPF: ABNORMAL /HPF

## 2019-04-08 PROCEDURE — 81001 URINALYSIS AUTO W/SCOPE: CPT

## 2019-04-08 PROCEDURE — 86780 TREPONEMA PALLIDUM: CPT

## 2019-04-08 PROCEDURE — 36415 COLL VENOUS BLD VENIPUNCTURE: CPT

## 2019-04-08 PROCEDURE — 85025 COMPLETE CBC W/AUTO DIFF WBC: CPT

## 2019-04-08 PROCEDURE — 86900 BLOOD TYPING SEROLOGIC ABO: CPT

## 2019-04-08 PROCEDURE — 86901 BLOOD TYPING SEROLOGIC RH(D): CPT

## 2019-04-08 PROCEDURE — 86762 RUBELLA ANTIBODY: CPT

## 2019-04-08 PROCEDURE — 86850 RBC ANTIBODY SCREEN: CPT

## 2019-04-08 PROCEDURE — 81511 FTL CGEN ABNOR FOUR ANAL: CPT

## 2019-04-08 PROCEDURE — 87340 HEPATITIS B SURFACE AG IA: CPT

## 2019-04-08 PROCEDURE — 87389 HIV-1 AG W/HIV-1&-2 AB AG IA: CPT

## 2019-04-11 LAB
# FETUSES US: NORMAL
AFP MOM SERPL: 1.06
AFP SERPL-MCNC: 41 NG/ML
AGE - REPORTED: 24.7 YR
CURRENT SMOKER: NO
FAMILY MEMBER DISEASES HX: NO
GA METHOD: NORMAL
GA: NORMAL WK
HCG MOM SERPL: 1.9
HCG SERPL-ACNC: NORMAL IU/L
HX OF HEREDITARY DISORDERS: NO
IDDM PATIENT QL: NO
INHIBIN A MOM SERPL: 1.64
INHIBIN A SERPL-MCNC: 283 PG/ML
INTEGRATED SCN PATIENT-IMP: NORMAL
PATHOLOGY STUDY: NORMAL
SPECIMEN DRAWN SERPL: NORMAL
U ESTRIOL MOM SERPL: 1.06
U ESTRIOL SERPL-MCNC: 1.13 NG/ML

## 2019-04-12 ENCOUNTER — ROUTINE PRENATAL (OUTPATIENT)
Dept: OBGYN | Facility: CLINIC | Age: 24
End: 2019-04-12
Payer: COMMERCIAL

## 2019-04-12 VITALS — BODY MASS INDEX: 26.56 KG/M2 | WEIGHT: 136 LBS | DIASTOLIC BLOOD PRESSURE: 68 MMHG | SYSTOLIC BLOOD PRESSURE: 112 MMHG

## 2019-04-12 DIAGNOSIS — Z34.82 ENCOUNTER FOR SUPERVISION OF OTHER NORMAL PREGNANCY IN SECOND TRIMESTER: ICD-10-CM

## 2019-04-12 PROCEDURE — 90040 PR PRENATAL FOLLOW UP: CPT | Performed by: ADVANCED PRACTICE MIDWIFE

## 2019-04-12 NOTE — PROGRESS NOTES
Pt here today for OB follow up @17w1d  Pt denies vaginal bleeding or cramps/contractions  Reports +FM  Good # verified  Pharmacy Confirmed.  Flu vaccine 1/08/2019

## 2019-04-13 NOTE — PROGRESS NOTES
Patient here for PINKY visit at 17w1d of pregnancy. She reports absent fetal movement, denies vaginal bleeding or cramping/regular contractions. She reports overall today she is feeling well and without complaints. No recent ER visits since last seen. Anatomy US is scheduled in 3 weeks. She and partner are inquiring about physical activity. We also reviewed diet. Adequate hydration reviewed in detail with patient. Precautions and warning signs reviewed with patient. RTC in 4 week(s) for PINKY visit.

## 2019-05-03 ENCOUNTER — HOSPITAL ENCOUNTER (OUTPATIENT)
Dept: RADIOLOGY | Facility: MEDICAL CENTER | Age: 24
End: 2019-05-03
Attending: ADVANCED PRACTICE MIDWIFE
Payer: COMMERCIAL

## 2019-05-03 DIAGNOSIS — Z34.82 ENCOUNTER FOR SUPERVISION OF OTHER NORMAL PREGNANCY IN SECOND TRIMESTER: ICD-10-CM

## 2019-05-03 PROCEDURE — 76805 OB US >/= 14 WKS SNGL FETUS: CPT

## 2019-05-16 ENCOUNTER — ROUTINE PRENATAL (OUTPATIENT)
Dept: OBGYN | Facility: CLINIC | Age: 24
End: 2019-05-16
Payer: COMMERCIAL

## 2019-05-16 VITALS — SYSTOLIC BLOOD PRESSURE: 112 MMHG | BODY MASS INDEX: 27.54 KG/M2 | DIASTOLIC BLOOD PRESSURE: 62 MMHG | WEIGHT: 141 LBS

## 2019-05-16 DIAGNOSIS — Z34.80 SUPERVISION OF OTHER NORMAL PREGNANCY, ANTEPARTUM: Primary | ICD-10-CM

## 2019-05-16 PROBLEM — Z32.01 POSITIVE BLOOD PREGNANCY TEST: Status: RESOLVED | Noted: 2019-02-04 | Resolved: 2019-05-16

## 2019-05-16 PROCEDURE — 90040 PR PRENATAL FOLLOW UP: CPT | Performed by: NURSE PRACTITIONER

## 2019-05-22 ENCOUNTER — PATIENT MESSAGE (OUTPATIENT)
Dept: OBGYN | Facility: CLINIC | Age: 24
End: 2019-05-22

## 2019-05-22 NOTE — TELEPHONE ENCOUNTER
----- Message from Marian Dunaway sent at 5/22/2019  8:36 AM PDT -----  Regarding: RE: Non-Urgent Medical Question  Contact: [phone]  anything I can take for a cold?    5/22/19 1121 Spoke with patient and provided OTC cold medications safe in pregnancy, adv pt to rest, hydrate and if sx worse, pt needs to go to Urgent Care . Pt verbalized understanding and had no further questions         ----- Message -----  From: Sabrina Escudero, Med Ass't  Sent: 5/20/19, 3:42 PM  To: Marian Dunaway  Subject: RE: Non-Urgent Medical Question    Damion Fonseca,    Yes, it is normal to loose her during pregnancy.    Memo    ----- Message -----     From: Marian Dunaway     Sent: 5/20/2019  9:51 AM PDT       To: Yaritza Ring C.N.M.  Subject: RE: Non-Urgent Medical Question    is it normal to loose hair?     ----- Message -----  From: Sabrina Escudero, Med Ass't  Sent: 5/17/19, 4:28 PM  To: Marian Dunaway  Subject: RE: Non-Urgent Medical Question    Damion Fonseca,    What is your question?    Memo    ----- Message -----     From: Marian Dunaway     Sent: 5/17/2019  3:52 PM PDT       To: Yaritza Ring C.N.M.  Subject: Non-Urgent Medical Question    Hello I have question

## 2019-06-13 ENCOUNTER — ROUTINE PRENATAL (OUTPATIENT)
Dept: OBGYN | Facility: CLINIC | Age: 24
End: 2019-06-13
Payer: COMMERCIAL

## 2019-06-13 VITALS — WEIGHT: 146 LBS | SYSTOLIC BLOOD PRESSURE: 110 MMHG | DIASTOLIC BLOOD PRESSURE: 60 MMHG | BODY MASS INDEX: 28.51 KG/M2

## 2019-06-13 DIAGNOSIS — Z34.92 SECOND TRIMESTER PREGNANCY: ICD-10-CM

## 2019-06-13 PROCEDURE — 90040 PR PRENATAL FOLLOW UP: CPT | Performed by: OBSTETRICS & GYNECOLOGY

## 2019-06-13 NOTE — PROGRESS NOTES
Pt here today for OB follow up  Pt states doing well  Reports +FM  Good # [phone]  Pharmacy Confirmed.

## 2019-06-13 NOTE — PROGRESS NOTES
S: Pt presents for routine OB follow up. Good fetal movement.  No contractions, vaginal bleeding, or leakage of fluid.    Questions answered.    O: There were no vitals taken for this visit.  Patients' weight gain, fluid intake and exercise level discussed.  Vitals, fundal height , fetal position, and FHR reviewed on flowsheet    Lab:No results found for this or any previous visit (from the past 336 hour(s)).    A/P:  23 y.o.  at 26w0d presents for routine obstetric follow-up.  Size equals dates and/or scan    1.  Continue prenatal vitamins.  2.  Fetal kick counts.  3.  Exercise at least 30 minutes daily.  4.  Drink at least 2L of water daily  5.  Labor precautions educated.  6.  Follow-up in 4 weeks.  7.  glucola and CBC ordered today.

## 2019-06-14 ENCOUNTER — HOSPITAL ENCOUNTER (OUTPATIENT)
Dept: LAB | Facility: MEDICAL CENTER | Age: 24
End: 2019-06-14
Attending: OBSTETRICS & GYNECOLOGY
Payer: COMMERCIAL

## 2019-06-14 DIAGNOSIS — Z34.92 SECOND TRIMESTER PREGNANCY: ICD-10-CM

## 2019-06-14 LAB
ERYTHROCYTE [DISTWIDTH] IN BLOOD BY AUTOMATED COUNT: 44.1 FL (ref 35.9–50)
HCT VFR BLD AUTO: 37.8 % (ref 37–47)
HGB BLD-MCNC: 12.3 G/DL (ref 12–16)
MCH RBC QN AUTO: 31.2 PG (ref 27–33)
MCHC RBC AUTO-ENTMCNC: 32.5 G/DL (ref 33.6–35)
MCV RBC AUTO: 95.9 FL (ref 81.4–97.8)
PLATELET # BLD AUTO: 230 K/UL (ref 164–446)
PMV BLD AUTO: 11.1 FL (ref 9–12.9)
RBC # BLD AUTO: 3.94 M/UL (ref 4.2–5.4)
WBC # BLD AUTO: 12.3 K/UL (ref 4.8–10.8)

## 2019-06-14 PROCEDURE — 36415 COLL VENOUS BLD VENIPUNCTURE: CPT

## 2019-06-14 PROCEDURE — 86780 TREPONEMA PALLIDUM: CPT

## 2019-06-14 PROCEDURE — 85027 COMPLETE CBC AUTOMATED: CPT

## 2019-06-14 PROCEDURE — 82950 GLUCOSE TEST: CPT

## 2019-06-15 LAB
GLUCOSE 1H P 50 G GLC PO SERPL-MCNC: 141 MG/DL (ref 70–139)
TREPONEMA PALLIDUM IGG+IGM AB [PRESENCE] IN SERUM OR PLASMA BY IMMUNOASSAY: NON REACTIVE

## 2019-06-18 DIAGNOSIS — Z3A.26 26 WEEKS GESTATION OF PREGNANCY: ICD-10-CM

## 2019-06-18 NOTE — PROGRESS NOTES
[facility]  You failed the 1 hour glucose test. I have ordered a three hour test for you to do. It is a fasting test. Please do at your earliest convenience.     Rivka Banks

## 2019-06-19 ENCOUNTER — TELEPHONE (OUTPATIENT)
Dept: OBGYN | Facility: CLINIC | Age: 24
End: 2019-06-19

## 2019-06-19 NOTE — TELEPHONE ENCOUNTER
06/19/19 1:08 PM     LM for pt to call back in regards to below.    Notes recorded by Rivka Banks D.O. on 6/18/2019 at 1:09 PM PDT  Sent in [facility],  You failed the 1 hour glucose test. I have ordered a three hour test for you to do. It is a fasting test. Please do at your earliest convenience.

## 2019-06-20 ENCOUNTER — TELEPHONE (OUTPATIENT)
Dept: OBGYN | Facility: CLINIC | Age: 24
End: 2019-06-20

## 2019-06-20 NOTE — TELEPHONE ENCOUNTER
Pt called states she wants to know why she got elevated results for her glucose test.  Was informed that body works different during pregnancy and if she has Hx of family w/DM is more prune to develop GDM or if she ate lots of carbs before test.  States her mother was GDM but hasn't been check for DM lately.  States she will prepare for next test on 6/24/19.    Has not further questions.

## 2019-06-22 ENCOUNTER — HOSPITAL ENCOUNTER (OUTPATIENT)
Dept: LAB | Facility: MEDICAL CENTER | Age: 24
End: 2019-06-22
Attending: OBSTETRICS & GYNECOLOGY
Payer: COMMERCIAL

## 2019-06-22 DIAGNOSIS — Z3A.26 26 WEEKS GESTATION OF PREGNANCY: ICD-10-CM

## 2019-06-22 LAB
GLUCOSE 1H P CHAL SERPL-MCNC: 182 MG/DL (ref 65–180)
GLUCOSE 2H P CHAL SERPL-MCNC: 146 MG/DL (ref 65–155)
GLUCOSE 3H P CHAL SERPL-MCNC: 109 MG/DL (ref 65–140)
GLUCOSE BS SERPL-MCNC: 90 MG/DL (ref 65–95)

## 2019-06-22 PROCEDURE — 82951 GLUCOSE TOLERANCE TEST (GTT): CPT

## 2019-06-22 PROCEDURE — 82952 GTT-ADDED SAMPLES: CPT

## 2019-06-22 PROCEDURE — 36415 COLL VENOUS BLD VENIPUNCTURE: CPT

## 2019-07-08 ENCOUNTER — ROUTINE PRENATAL (OUTPATIENT)
Dept: OBGYN | Facility: CLINIC | Age: 24
End: 2019-07-08
Payer: COMMERCIAL

## 2019-07-08 VITALS — DIASTOLIC BLOOD PRESSURE: 70 MMHG | SYSTOLIC BLOOD PRESSURE: 106 MMHG | BODY MASS INDEX: 29.1 KG/M2 | WEIGHT: 149 LBS

## 2019-07-08 DIAGNOSIS — R73.09 ELEVATED GLUCOSE: ICD-10-CM

## 2019-07-08 DIAGNOSIS — Z34.80 SUPERVISION OF OTHER NORMAL PREGNANCY, ANTEPARTUM: Primary | ICD-10-CM

## 2019-07-08 PROCEDURE — 90040 PR PRENATAL FOLLOW UP: CPT | Performed by: NURSE PRACTITIONER

## 2019-07-08 PROCEDURE — 90471 IMMUNIZATION ADMIN: CPT | Performed by: NURSE PRACTITIONER

## 2019-07-08 PROCEDURE — 90715 TDAP VACCINE 7 YRS/> IM: CPT | Performed by: NURSE PRACTITIONER

## 2019-07-08 NOTE — PROGRESS NOTES
Pt here today for OB follow up  Pt states denies VB and LOF  Reports +FM  Good # [phone]  Pharmacy Confirmed.

## 2019-07-08 NOTE — LETTER
"Count Your Baby's Movements  Another step to a healthy delivery    Marian Patrice             Dept: [phone]    How Many Weeks Pregnant? 29w4d    Date to Begin Countin2019              How to use this chart    One way for your physician to keep track of your baby's health is by knowing how often the baby moves (or \"kicks\") in your womb.  You can help your physician to do this by using this chart every day.    Every day, you should see how many hours it takes for your baby to move 10 times.  Start in the morning, as soon as you get up.    · First, write down the time your baby moves until you get to 10.  · Check off one box every time your baby moves until you get to 10.  · Write down the time you finished counting in the last column.  · Total how long it took to count up all 10 movements.  · Finally, fill in the box that shows how long this took.  After counting 10 movements, you no longer have to count any more that day.  The next morning, just start counting again as soon as you get up.    What should you call a \"movement\"?  It is hard to say, because it will feel different from one mother to another and from one pregnancy to the next.  The important thing is that you count the movements the same way throughout your pregnancy.  If you have more questions, you should ask your physician.    Count carefully every day!  SAMPLE:  Week 28    How many hours did it take to feel 10 movements?       Start  Time     1     2     3     4     5     6     7     8     9     10   Finish Time   Mon 8:20 ·  ·  ·  ·  ·  ·  ·  ·  ·  ·  11:40                  Sat               Sun                 IMPORTANT: You should contact your physician if it takes more than two hours for you to feel 10 movements.  Each morning, write down the time and start to count the movements of your baby.  Keep track by checking off one box every time you feel one movement.  When you " "have felt 10 \"kicks\", write down the time you finished counting in the last column.  Then fill in the   box (over the check maria) for the number of hours it took.  Be sure to read the complete instructions on the previous page.            "

## 2019-07-09 NOTE — PROGRESS NOTES
S) Pt is a 23 y.o.   at 29w4d  gestation. Routine prenatal care today. No complaints today. Glucose results reviewed. FKC sheet discussed, tdap today.  labor precautions reviewed, all questions answered.    Fetal movement Normal  Cramping no  VB no  LOF no   Denies dysuria. Generally feels well today. Good self-care activities identified. Denies headaches, swelling, visual changes, or epigastric pain .     O) /70   Wt 67.6 kg (149 lb)         Labs:       PNL: WNL       GCT: 141, but 3 hour WNL        AFP: normal       GBS: N/A       Pertinent ultrasound -        5/3/19- Survey WNL, ALEX 12.5cm, c/w prev dating.    A) IUP at 29w4d       S=D         Patient Active Problem List    Diagnosis Date Noted   • Supervision of other normal pregnancy, antepartum 2019          SVE: deferred       Chaperone offered: n/a         TDAP: yes       FLU: yes        BTL: no       : n/a       C/S Consent: n/a       IOL or C/S scheduled: no       LAST PAP: 19- Negative         P) s/s ptl vs general discomforts. Fetal movements reviewed. General ed and anticipatory guidance. Nutrition/exercise/vitamin. Plans breast Plans pp contraception- unsure  Continue PNV.

## 2019-07-23 ENCOUNTER — ROUTINE PRENATAL (OUTPATIENT)
Dept: OBGYN | Facility: CLINIC | Age: 24
End: 2019-07-23
Payer: COMMERCIAL

## 2019-07-23 VITALS — BODY MASS INDEX: 29.88 KG/M2 | WEIGHT: 153 LBS | DIASTOLIC BLOOD PRESSURE: 60 MMHG | SYSTOLIC BLOOD PRESSURE: 110 MMHG

## 2019-07-23 DIAGNOSIS — Z34.80 SUPERVISION OF OTHER NORMAL PREGNANCY, ANTEPARTUM: ICD-10-CM

## 2019-07-23 PROBLEM — R73.09 ELEVATED GLUCOSE: Status: RESOLVED | Noted: 2019-07-08 | Resolved: 2019-07-23

## 2019-07-23 PROCEDURE — 90040 PR PRENATAL FOLLOW UP: CPT | Performed by: OBSTETRICS & GYNECOLOGY

## 2019-07-23 NOTE — PROGRESS NOTES
Patient is at 31w5d .no complaints  Fetal Movement : positive       Patients' weight gain, fluid intake and exercise level discussed.Vitals, fundal height , fetal position, and FHR reviewed on flowsheet.    .../60   Wt 69.4 kg (153 lb)   BMI 29.88 kg/m²   Past Medical History:   Diagnosis Date   • History of hepatitis B     in childhood     Patient Active Problem List    Diagnosis Date Noted   • Supervision of other normal pregnancy, antepartum 05/16/2019     Priority: High         Lab:No results found for this or any previous visit (from the past 336 hour(s)).    Assessment:  1  31w5d  2. . Doing well  3. Size equals Dates and/or Scan  4. Weight gain: normal: No, excessive:Yes                        Plan.  1. Rediscuss diet.  2. Increase water intake PRN  3. Continue vitamins.  4. Kick counts as instructed.  5. Discuss support hose and proper shoe wear as indicated.

## 2019-08-13 ENCOUNTER — ROUTINE PRENATAL (OUTPATIENT)
Dept: OBGYN | Facility: CLINIC | Age: 24
End: 2019-08-13
Payer: COMMERCIAL

## 2019-08-13 VITALS — WEIGHT: 156 LBS | BODY MASS INDEX: 30.47 KG/M2 | SYSTOLIC BLOOD PRESSURE: 104 MMHG | DIASTOLIC BLOOD PRESSURE: 56 MMHG

## 2019-08-13 DIAGNOSIS — Z34.83 ENCOUNTER FOR SUPERVISION OF OTHER NORMAL PREGNANCY, THIRD TRIMESTER: ICD-10-CM

## 2019-08-13 PROCEDURE — 90040 PR PRENATAL FOLLOW UP: CPT | Performed by: OBSTETRICS & GYNECOLOGY

## 2019-08-13 NOTE — PROGRESS NOTES
S: Pt presents for routine OB follow up. Reports normal fetal movements.  No contractions, vaginal bleeding, or leakage of fluid.  Denies headaches.  Questions answered.    O: /56   Wt 70.8 kg (156 lb)   BMI 30.47 kg/m²   Patients' weight gain, fluid intake and exercise level discussed.  Vitals, fundal height , fetal position, and FHR reviewed on flowsheet    Lab:No results found for this or any previous visit (from the past 336 hour(s)).    A/P:  23 y.o.  at 34w5d presents for routine obstetric follow-up.  Patient is doing well  Size equals dates   1.  Continue prenatal vitamins.  2.  Fetal kick counts daily discussed.  3.  Exercise at least 30 minutes daily.  4.  Drink at least 2L of water daily  5.  Pregnancy and  labor precautions educated.  6.  Follow-up in 1 week  7.  GBS culture at follow-up visit discussed  8.  Precautions and plan of care reviewed

## 2019-08-13 NOTE — PROGRESS NOTES
Pt here today for OB follow up @ 34w5d  Pt states doing well  Reports +FM  Good # verified  Pharmacy Confirmed.

## 2019-08-23 ENCOUNTER — HOSPITAL ENCOUNTER (OUTPATIENT)
Facility: MEDICAL CENTER | Age: 24
End: 2019-08-23
Attending: ADVANCED PRACTICE MIDWIFE
Payer: COMMERCIAL

## 2019-08-23 ENCOUNTER — ROUTINE PRENATAL (OUTPATIENT)
Dept: OBGYN | Facility: CLINIC | Age: 24
End: 2019-08-23
Payer: COMMERCIAL

## 2019-08-23 VITALS — SYSTOLIC BLOOD PRESSURE: 100 MMHG | BODY MASS INDEX: 30.86 KG/M2 | DIASTOLIC BLOOD PRESSURE: 60 MMHG | WEIGHT: 158 LBS

## 2019-08-23 DIAGNOSIS — Z34.83 ENCOUNTER FOR SUPERVISION OF OTHER NORMAL PREGNANCY, THIRD TRIMESTER: ICD-10-CM

## 2019-08-23 PROCEDURE — 87150 DNA/RNA AMPLIFIED PROBE: CPT

## 2019-08-23 PROCEDURE — 90040 PR PRENATAL FOLLOW UP: CPT | Performed by: ADVANCED PRACTICE MIDWIFE

## 2019-08-23 PROCEDURE — 87081 CULTURE SCREEN ONLY: CPT

## 2019-08-23 NOTE — PROGRESS NOTES
SUBJECTIVE:  Pt is a 23 y.o.   at 36w1d  gestation. Presents today for follow-up prenatal care. Has not been seen in ER or L & D since last visit. Reports good  fetal movement. Denies leaking of fluid dysuria, headaches, or N/V at this time.  Patient does not report cramping/contractions. Generally feels well today.     OBJECTIVE:   There were no vitals taken for this visit.  Patients' weight gain, fluid intake and exercise level discussed.  Vitals, fundal height , fetal position, and FHR reviewed on flowsheet    SVE: /-3    Lab:No results found for this or any previous visit (from the past 336 hour(s)).    ASSESSMENT/ PLAN:   - IUP at 36w1d    - S=D   -   Patient Active Problem List    Diagnosis Date Noted   • Supervision of other normal pregnancy, antepartum 2019     Priority: High   • Encounter for supervision of other normal pregnancy, third trimester 2019         - S/sx pregnancy and labor warning signs vs general discomforts discussed  - Fetal movements and kick counts reviewed. Adequate hydration reinforced  - Anticipatory guidance provided.GBS explained and collected.   - RTC in 1 weeks for routine prenatal care.

## 2019-08-23 NOTE — PROGRESS NOTES
Pt here today for OB follow up  Pt states she is feeling tired   Reports +FM  Good # [phone]  Pharmacy Confirmed.  Chaperone offered and not indicated.   GBS today.

## 2019-08-25 LAB — GP B STREP DNA SPEC QL NAA+PROBE: NEGATIVE

## 2019-08-29 ENCOUNTER — ROUTINE PRENATAL (OUTPATIENT)
Dept: OBGYN | Facility: CLINIC | Age: 24
End: 2019-08-29
Payer: COMMERCIAL

## 2019-08-29 VITALS — WEIGHT: 158 LBS | DIASTOLIC BLOOD PRESSURE: 62 MMHG | BODY MASS INDEX: 30.86 KG/M2 | SYSTOLIC BLOOD PRESSURE: 110 MMHG

## 2019-08-29 DIAGNOSIS — Z34.83 ENCOUNTER FOR SUPERVISION OF OTHER NORMAL PREGNANCY, THIRD TRIMESTER: Primary | ICD-10-CM

## 2019-08-29 PROCEDURE — 90040 PR PRENATAL FOLLOW UP: CPT | Performed by: NURSE PRACTITIONER

## 2019-08-29 NOTE — PROGRESS NOTES
Pt here today for OB follow up @ 37w0d  Pt states doing well   Reports +FM  Good # [phone]  Pharmacy Confirmed.

## 2019-08-29 NOTE — PROGRESS NOTES
S:  Pt is  at 37w0d here for routine OB follow up.  No c/o today.  Excited about her baby shower this weekend.  Reports good FM.  Denies VB, LOF, RUCs, or vaginal DC.     O:  Please see above vitals.        FHTs: 140        Fundal ht: 35        Fetal position: vertex        SVE: deferred        GBS neg on 19 -- reviewed w pt.      A:  IUP at 37w0d  Patient Active Problem List    Diagnosis Date Noted   • Supervision of other normal pregnancy, antepartum 2019     Priority: High   • Encounter for supervision of other normal pregnancy, third trimester 2019       P:  1.  Continue FKCs.         2.  Labor precautions given.  Instructions given on where to go.  Pt receptive to education.         3.  Reviewed GBS status w pt.       4.  Questions answered.         5.  Encouraged adequate water intake       6.  F/u 1wk

## 2019-08-29 NOTE — PATIENT INSTRUCTIONS
P:  1.  Continue FKCs.         2.  Labor precautions given.  Instructions given on where to go.  Pt receptive to education.         3.  Reviewed GBS status w pt.       4.  Questions answered.         5.  Encouraged adequate water intake       6.  F/u 1wk

## 2019-09-06 ENCOUNTER — ROUTINE PRENATAL (OUTPATIENT)
Dept: OBGYN | Facility: CLINIC | Age: 24
End: 2019-09-06
Payer: COMMERCIAL

## 2019-09-06 VITALS — WEIGHT: 156 LBS | BODY MASS INDEX: 30.47 KG/M2 | DIASTOLIC BLOOD PRESSURE: 70 MMHG | SYSTOLIC BLOOD PRESSURE: 108 MMHG

## 2019-09-06 DIAGNOSIS — Z34.83 ENCOUNTER FOR SUPERVISION OF OTHER NORMAL PREGNANCY, THIRD TRIMESTER: ICD-10-CM

## 2019-09-06 PROCEDURE — 90040 PR PRENATAL FOLLOW UP: CPT | Performed by: OBSTETRICS & GYNECOLOGY

## 2019-09-06 NOTE — PROGRESS NOTES
Pt here today for OB follow up  Pt states pressure  Reports +FM  Good # [phone]  Pharmacy Confirmed.  Chaperone offered and provided.

## 2019-09-06 NOTE — PROGRESS NOTES
OB Followup;    38w1d    Patient Active Problem List    Diagnosis Date Noted   • Supervision of other normal pregnancy, antepartum 2019     Priority: High   • Encounter for supervision of other normal pregnancy, third trimester 2019       Vitals:    19 0812   BP: 108/70   Weight: 70.8 kg (156 lb)       Patient presents for followup of OB care. Currently doing well . Good fetal movement no leakage of fluid no contractions or vaginal bleeding        Size equals dates, normal fetal heart rate  Cervix-closed/50% effaced    Labs; GBS negative    Labor precautions given  Increase oral hydration  Discussed proper weight gain during pregnancy  Continue prenatal vitamins  Signs and symptoms of labor/ labor discussed     Followup in  1 weeks

## 2019-09-12 ENCOUNTER — ROUTINE PRENATAL (OUTPATIENT)
Dept: OBGYN | Facility: CLINIC | Age: 24
End: 2019-09-12
Payer: COMMERCIAL

## 2019-09-12 VITALS — SYSTOLIC BLOOD PRESSURE: 116 MMHG | DIASTOLIC BLOOD PRESSURE: 68 MMHG | WEIGHT: 160 LBS | BODY MASS INDEX: 31.25 KG/M2

## 2019-09-12 DIAGNOSIS — Z34.83 ENCOUNTER FOR SUPERVISION OF OTHER NORMAL PREGNANCY, THIRD TRIMESTER: ICD-10-CM

## 2019-09-12 PROCEDURE — 90040 PR PRENATAL FOLLOW UP: CPT | Performed by: ADVANCED PRACTICE MIDWIFE

## 2019-09-12 NOTE — PROGRESS NOTES
Pt here today for OB follow up @ 39w0d  Pt states doing well, denies vaginal bleeding or cramps/contractions  Reports +FM  Good # [phone]  Pharmacy Confirmed.  Pt requested cervical check

## 2019-09-13 NOTE — PROGRESS NOTES
SUBJECTIVE:  Pt is a 23 y.o.   at 39w0d  gestation. Presents today for follow-up prenatal care. Has not been seen in ER or L & D since last visit. Reports adequate fetal movement appropriate for gestational age. Denies leaking of fluid dysuria, headaches, or N/V at this time.  Patient does not report cramping/contractions. Generally feels well today.    OBJECTIVE:   /68   Wt 72.6 kg (160 lb)   BMI 31.25 kg/m²   Patients' weight gain, fluid intake and exercise level discussed.  Vitals, fundal height , fetal position, and FHR reviewed on flowsheet    Lab:No results found for this or any previous visit (from the past 336 hour(s)).    ASSESSMENT/ PLAN:   - IUP at 39w0d    - S=D   - GBS negative   Patient Active Problem List    Diagnosis Date Noted   • Supervision of other normal pregnancy, antepartum 2019     Priority: High   • Encounter for supervision of other normal pregnancy, third trimester 2019       Tdap: UTD  PP BCM: Discussed family planning options. Information given about nexplanon or mirena IUD, would like to think about options.    - S/sx pregnancy and labor warning signs vs general discomforts discussed  - Fetal movements and kick counts reviewed. Adequate hydration reinforced  - Anticipatory guidance provided   - RTC in 1 week for routine prenatal care.

## 2019-09-19 ENCOUNTER — ROUTINE PRENATAL (OUTPATIENT)
Dept: OBGYN | Facility: CLINIC | Age: 24
End: 2019-09-19
Payer: COMMERCIAL

## 2019-09-19 VITALS — WEIGHT: 159 LBS | SYSTOLIC BLOOD PRESSURE: 118 MMHG | BODY MASS INDEX: 31.05 KG/M2 | DIASTOLIC BLOOD PRESSURE: 72 MMHG

## 2019-09-19 DIAGNOSIS — Z34.80 SUPERVISION OF OTHER NORMAL PREGNANCY, ANTEPARTUM: ICD-10-CM

## 2019-09-19 PROCEDURE — 90040 PR PRENATAL FOLLOW UP: CPT | Performed by: OBSTETRICS & GYNECOLOGY

## 2019-09-19 NOTE — PROGRESS NOTES
Pt here today for OB follow up  Pt states doing well  Reports +FM  Good # [phone]  Pharmacy Confirmed.  Pt requested cervical check

## 2019-09-26 ENCOUNTER — HOSPITAL ENCOUNTER (INPATIENT)
Facility: MEDICAL CENTER | Age: 24
LOS: 2 days | End: 2019-09-28
Attending: OBSTETRICS & GYNECOLOGY | Admitting: OBSTETRICS & GYNECOLOGY
Payer: COMMERCIAL

## 2019-09-26 ENCOUNTER — APPOINTMENT (OUTPATIENT)
Dept: OBGYN | Facility: MEDICAL CENTER | Age: 24
End: 2019-09-26
Attending: OBSTETRICS & GYNECOLOGY
Payer: COMMERCIAL

## 2019-09-26 LAB
BASOPHILS # BLD AUTO: 0.3 % (ref 0–1.8)
BASOPHILS # BLD: 0.03 K/UL (ref 0–0.12)
EOSINOPHIL # BLD AUTO: 0.11 K/UL (ref 0–0.51)
EOSINOPHIL NFR BLD: 0.9 % (ref 0–6.9)
ERYTHROCYTE [DISTWIDTH] IN BLOOD BY AUTOMATED COUNT: 45.5 FL (ref 35.9–50)
HCT VFR BLD AUTO: 36.9 % (ref 37–47)
HGB BLD-MCNC: 11.6 G/DL (ref 12–16)
HOLDING TUBE BB 8507: NORMAL
IMM GRANULOCYTES # BLD AUTO: 0.12 K/UL (ref 0–0.11)
IMM GRANULOCYTES NFR BLD AUTO: 1 % (ref 0–0.9)
LYMPHOCYTES # BLD AUTO: 2.08 K/UL (ref 1–4.8)
LYMPHOCYTES NFR BLD: 17.4 % (ref 22–41)
MCH RBC QN AUTO: 27.8 PG (ref 27–33)
MCHC RBC AUTO-ENTMCNC: 31.4 G/DL (ref 33.6–35)
MCV RBC AUTO: 88.5 FL (ref 81.4–97.8)
MONOCYTES # BLD AUTO: 1.08 K/UL (ref 0–0.85)
MONOCYTES NFR BLD AUTO: 9 % (ref 0–13.4)
NEUTROPHILS # BLD AUTO: 8.52 K/UL (ref 2–7.15)
NEUTROPHILS NFR BLD: 71.4 % (ref 44–72)
NRBC # BLD AUTO: 0 K/UL
NRBC BLD-RTO: 0 /100 WBC
PLATELET # BLD AUTO: 246 K/UL (ref 164–446)
PMV BLD AUTO: 11.3 FL (ref 9–12.9)
RBC # BLD AUTO: 4.17 M/UL (ref 4.2–5.4)
WBC # BLD AUTO: 11.9 K/UL (ref 4.8–10.8)

## 2019-09-26 PROCEDURE — 10907ZC DRAINAGE OF AMNIOTIC FLUID, THERAPEUTIC FROM PRODUCTS OF CONCEPTION, VIA NATURAL OR ARTIFICIAL OPENING: ICD-10-PCS | Performed by: OBSTETRICS & GYNECOLOGY

## 2019-09-26 PROCEDURE — 700105 HCHG RX REV CODE 258: Performed by: OBSTETRICS & GYNECOLOGY

## 2019-09-26 PROCEDURE — 700102 HCHG RX REV CODE 250 W/ 637 OVERRIDE(OP): Performed by: OBSTETRICS & GYNECOLOGY

## 2019-09-26 PROCEDURE — 770002 HCHG ROOM/CARE - OB PRIVATE (112)

## 2019-09-26 PROCEDURE — 700111 HCHG RX REV CODE 636 W/ 250 OVERRIDE (IP): Performed by: OBSTETRICS & GYNECOLOGY

## 2019-09-26 PROCEDURE — A9270 NON-COVERED ITEM OR SERVICE: HCPCS | Performed by: OBSTETRICS & GYNECOLOGY

## 2019-09-26 PROCEDURE — 36415 COLL VENOUS BLD VENIPUNCTURE: CPT

## 2019-09-26 PROCEDURE — 4A1HXCZ MONITORING OF PRODUCTS OF CONCEPTION, CARDIAC RATE, EXTERNAL APPROACH: ICD-10-PCS | Performed by: OBSTETRICS & GYNECOLOGY

## 2019-09-26 PROCEDURE — 85025 COMPLETE CBC W/AUTO DIFF WBC: CPT

## 2019-09-26 PROCEDURE — 3E0P7VZ INTRODUCTION OF HORMONE INTO FEMALE REPRODUCTIVE, VIA NATURAL OR ARTIFICIAL OPENING: ICD-10-PCS | Performed by: OBSTETRICS & GYNECOLOGY

## 2019-09-26 RX ORDER — LIDOCAINE HYDROCHLORIDE 10 MG/ML
INJECTION, SOLUTION INFILTRATION; PERINEURAL
Status: COMPLETED
Start: 2019-09-26 | End: 2019-09-26

## 2019-09-26 RX ORDER — METHYLERGONOVINE MALEATE 0.2 MG/ML
0.2 INJECTION INTRAVENOUS
Status: DISCONTINUED | OUTPATIENT
Start: 2019-09-26 | End: 2019-09-27 | Stop reason: HOSPADM

## 2019-09-26 RX ORDER — ONDANSETRON 2 MG/ML
4 INJECTION INTRAMUSCULAR; INTRAVENOUS EVERY 6 HOURS PRN
Status: DISCONTINUED | OUTPATIENT
Start: 2019-09-26 | End: 2019-09-28 | Stop reason: HOSPADM

## 2019-09-26 RX ORDER — SODIUM CHLORIDE, SODIUM LACTATE, POTASSIUM CHLORIDE, CALCIUM CHLORIDE 600; 310; 30; 20 MG/100ML; MG/100ML; MG/100ML; MG/100ML
INJECTION, SOLUTION INTRAVENOUS CONTINUOUS
Status: DISPENSED | OUTPATIENT
Start: 2019-09-26 | End: 2019-09-27

## 2019-09-26 RX ORDER — MISOPROSTOL 200 UG/1
800 TABLET ORAL
Status: COMPLETED | OUTPATIENT
Start: 2019-09-26 | End: 2019-09-26

## 2019-09-26 RX ORDER — ONDANSETRON 4 MG/1
4 TABLET, ORALLY DISINTEGRATING ORAL EVERY 6 HOURS PRN
Status: DISCONTINUED | OUTPATIENT
Start: 2019-09-26 | End: 2019-09-28 | Stop reason: HOSPADM

## 2019-09-26 RX ADMIN — FENTANYL CITRATE 100 MCG: 50 INJECTION INTRAMUSCULAR; INTRAVENOUS at 21:58

## 2019-09-26 RX ADMIN — SODIUM CHLORIDE, POTASSIUM CHLORIDE, SODIUM LACTATE AND CALCIUM CHLORIDE: 600; 310; 30; 20 INJECTION, SOLUTION INTRAVENOUS at 23:08

## 2019-09-26 RX ADMIN — SODIUM CHLORIDE, POTASSIUM CHLORIDE, SODIUM LACTATE AND CALCIUM CHLORIDE: 600; 310; 30; 20 INJECTION, SOLUTION INTRAVENOUS at 20:15

## 2019-09-26 RX ADMIN — FENTANYL CITRATE 100 MCG: 50 INJECTION INTRAMUSCULAR; INTRAVENOUS at 23:56

## 2019-09-26 RX ADMIN — ONDANSETRON 4 MG: 2 INJECTION INTRAMUSCULAR; INTRAVENOUS at 21:58

## 2019-09-26 RX ADMIN — MISOPROSTOL 25 MCG: 100 TABLET ORAL at 13:35

## 2019-09-26 RX ADMIN — OXYTOCIN 2 MILLI-UNITS/MIN: 10 INJECTION, SOLUTION INTRAMUSCULAR; INTRAVENOUS at 20:19

## 2019-09-26 ASSESSMENT — LIFESTYLE VARIABLES
TOTAL SCORE: 0
HOW MANY TIMES IN THE PAST YEAR HAVE YOU HAD 5 OR MORE DRINKS IN A DAY: 0
TOTAL SCORE: 0
HAVE PEOPLE ANNOYED YOU BY CRITICIZING YOUR DRINKING: NO
CONSUMPTION TOTAL: NEGATIVE
DOES PATIENT WANT TO STOP DRINKING: NO
ON A TYPICAL DAY WHEN YOU DRINK ALCOHOL HOW MANY DRINKS DO YOU HAVE: 0
ALCOHOL_USE: NO
EVER FELT BAD OR GUILTY ABOUT YOUR DRINKING: NO
TOTAL SCORE: 0
HAVE YOU EVER FELT YOU SHOULD CUT DOWN ON YOUR DRINKING: NO
EVER HAD A DRINK FIRST THING IN THE MORNING TO STEADY YOUR NERVES TO GET RID OF A HANGOVER: NO
AVERAGE NUMBER OF DAYS PER WEEK YOU HAVE A DRINK CONTAINING ALCOHOL: 0

## 2019-09-26 ASSESSMENT — PATIENT HEALTH QUESTIONNAIRE - PHQ9
2. FEELING DOWN, DEPRESSED, IRRITABLE, OR HOPELESS: NOT AT ALL
2. FEELING DOWN, DEPRESSED, IRRITABLE, OR HOPELESS: NOT AT ALL
SUM OF ALL RESPONSES TO PHQ9 QUESTIONS 1 AND 2: 0
1. LITTLE INTEREST OR PLEASURE IN DOING THINGS: NOT AT ALL
1. LITTLE INTEREST OR PLEASURE IN DOING THINGS: NOT AT ALL
SUM OF ALL RESPONSES TO PHQ9 QUESTIONS 1 AND 2: 0

## 2019-09-26 NOTE — H&P
Obstetrics & Gynecology History & Physical Note    Date of Service  2019    Chief Complaint  Here for scheduled induction of labor    History of Presenting Illness  Marian Dee is a 23 y.o.  at 41w0d 2019, by Ultrasound. No LMP recorded. Patient is pregnant. She is being admitted for induction of labor due to post EDC status.  Patient is doing well without any complaints.  She reports normal fetal movements.  Denies bleeding or leaking.  Denies contractions or cramping.  Denies headaches    Prenatal care is at [facility] and is complicated by:      Patient Active Problem List    Diagnosis Date Noted   • Supervision of other normal pregnancy, antepartum 2019     Priority: High   • Encounter for supervision of other normal pregnancy, third trimester 2019   Abnormal 1 hour GCT with normal 3-hour    Obstetric History  OB History    Para Term  AB Living   3 1 1   1 1   SAB TAB Ectopic Molar Multiple Live Births     1       1      # Outcome Date GA Lbr Janes/2nd Weight Sex Delivery Anes PTL Lv   3 Current            2 Term 09/20/15 40w0d  3.164 kg (6 lb 15.6 oz) M Vag-Spont None N ISAIAH   1 TAB 10/2013 12w0d              Gynecologic History  Negative    Review of Systems  ROS all organ systems were reviewed and are currently negative    Medical History   has a past medical history of History of hepatitis B.    Surgical History   has no past surgical history on file.     Family History  family history includes Diabetes in her paternal grandmother; Hyperlipidemia in her maternal grandmother, paternal grandfather, and paternal grandmother; No Known Problems in her father and mother.     Social History   reports that she has never smoked. She has never used smokeless tobacco. She reports that she does not drink alcohol or use drugs.    Allergies  No Known Allergies    Medications  Prior to Admission Medications   Prescriptions Last Dose Informant Patient Reported? Taking?    Prenatal MV-Min-Fe Fum-FA-DHA (PRENATAL 1 PO) 9/25/2019 at Unknown time  Yes No   Sig: Take  by mouth.      Facility-Administered Medications: None       Physical Exam  Vitals:    09/26/19 1100   Weight: 72.6 kg (159 lb 15.8 oz)       General:   alert, cooperative, no distress   Skin:   normal   HEENT:  PERRLA and extraocular movements intact   Lungs:   CTA bilateral, clear to auscultation bilaterally   Heart:   regular rate and rhythm   Breasts:   no skin dimpling or peau d'orange, deferred   Abdomen:  Abdomen soft, non-tender; gravid.   Pelvis: External genitalia: normal general appearance   FHT:  130s BPM Fetal heart variability: moderate   [place]:  Few contractions noted   Presentations:   Vtx by US   Cervix:     Dilation:  1    Effacement:  thick    Station:   high    Consistency:  firm    Position:  post     Limited transabdominal ultrasound was performed and read by me:  Indication is for fetal presentation-nurse was unable to ascertain fetal presentation    Keyes intrauterine pregnancy in vertex presentation noted  Fetal heart rate is 136 bpm  Amniotic fluid appears subjectively normal   Placenta is posterior        Laboratory:  Prenatal Results     General (Most Recent Result)     Test Value Reference Range Date Time    ABO O   04/08/19 1734    Rh POS   04/08/19 1734    Antibody screen NEG   04/08/19 1734    HbA1c        Gonorrhea Negative  Negative 01/08/19 0546    Chlamydia  by PCR Negative  Negative 01/08/19 0546    Chlamydia by DNA        RPR/Syphilus Non Reactive  Non Reactive 06/14/19 1531    HSV 1/2 by PCR (non-serum)        HSV 1/2 (serum)        HSV 1        1 to 2        HPV (16) Negative  Negative 01/08/19 0546    HPV (18) Negative  Negative 01/08/19 0546    HPV (other) Negative  Negative 01/08/19 0546    HBsAg Negative  Negative 04/08/19 1734    HIV-1 HIV-2 Antibodies Non Reactive  Non Reactive 04/08/19 1734    Rubella 40.20 IU/mL  04/08/19 1734    Tb              Pap Smear (Most Recent  Result)     Test Value Reference Range Date Time    Pap smear        Pap smear w/HPV        Pap smear w/CTNG        Pap smar w/HPV CTNG  (See Report)    01/08/19 0546    Pap smear (reflex HPV ACUS)        Pap smear (reflex HPV ASCUS w/CTNG)        Pathology gyn specimen  (See Report)    01/08/19 1710          Urinalysis (Most Recent Result)     Test Value Reference Range Date Time    Urinalysis  Abnormal    (See Report)    05/19/15 0450    Urinalysis (culture if indicated)        POC urinalysis  (See Report)    01/03/19 1704    Urine drug screen        Urine drug screen (w/o conf)        Urine culture ([id number])        Urine culture ([id number])              1st Trimester     Test Value Reference Range Date Time    Hgb        Hct        Fasting Glucose Tolerance        GTT, 1 hour        GTT, 2 hours        GTT, 3 hours              2nd Trimester     Test Value Reference Range Date Time    Hgb 12.3 g/dL 12.0 - 16.0 06/14/19 1531      13.2 g/dL 12.0 - 16.0 04/08/19 1734    Hct 37.8 % 37.0 - 47.0 06/14/19 1531      39.4 % 37.0 - 47.0 04/08/19 1734    Urinalysis        Urine Culture        AST        ALT        Uric Acid        Fasting Glucose Tolerance        GTT, 1 hour 182 mg/dL 65 - 180 06/22/19 0830    GTT, 2 hours 146 mg/dL 65 - 155 06/22/19 0830    GTT, 3 hours 109 mg/dL 65 - 140 06/22/19 0830    Urine Protein/Creatinine Ratio              3rd Trimester     Test Value Reference Range Date Time    Hgb        Hct        Platelet count        GBS (CLEMONS BROTH) Negative  Negative 08/23/19 1639    GBS (Direct) Negative  Negative 08/23/19 1639    Urinalysis        Urine Culture        Urine Drug Screen        Urine Protein/Creatinine Ratio              Congenital Disease Screening     Test Value Reference Range Date Time    First Trimester Screen        Quad Screen        Sickle Cell        Thalassemia        Lexa-Sac        Cystic Fibrosis Carrier Study                      Urinalysis:    No results found      Imaging:  See above      Assessment:  23 y.o.  41w0d  Post EDC pregnancy  Admission for induction of labor due to post EDC status  Category 1 fetal heart rate tracing/reactive  Unfavorable cervical exam      Plan:  We will admit to labor and delivery  Cervical ripening with Cytotec followed by Pitocin induction of labor discussed  We discussed pain management in labor    Precautions and plan of care were discussed.    Colin Tenorio M.D.

## 2019-09-27 LAB
ERYTHROCYTE [DISTWIDTH] IN BLOOD BY AUTOMATED COUNT: 44.4 FL (ref 35.9–50)
HCT VFR BLD AUTO: 35.3 % (ref 37–47)
HGB BLD-MCNC: 11.1 G/DL (ref 12–16)
MCH RBC QN AUTO: 27.2 PG (ref 27–33)
MCHC RBC AUTO-ENTMCNC: 31.4 G/DL (ref 33.6–35)
MCV RBC AUTO: 86.5 FL (ref 81.4–97.8)
PLATELET # BLD AUTO: 219 K/UL (ref 164–446)
PMV BLD AUTO: 10.9 FL (ref 9–12.9)
RBC # BLD AUTO: 4.08 M/UL (ref 4.2–5.4)
WBC # BLD AUTO: 22.2 K/UL (ref 4.8–10.8)

## 2019-09-27 PROCEDURE — 59409 OBSTETRICAL CARE: CPT

## 2019-09-27 PROCEDURE — 770002 HCHG ROOM/CARE - OB PRIVATE (112)

## 2019-09-27 PROCEDURE — A9270 NON-COVERED ITEM OR SERVICE: HCPCS | Performed by: OBSTETRICS & GYNECOLOGY

## 2019-09-27 PROCEDURE — 700102 HCHG RX REV CODE 250 W/ 637 OVERRIDE(OP): Performed by: OBSTETRICS & GYNECOLOGY

## 2019-09-27 PROCEDURE — 700111 HCHG RX REV CODE 636 W/ 250 OVERRIDE (IP): Performed by: OBSTETRICS & GYNECOLOGY

## 2019-09-27 PROCEDURE — 85027 COMPLETE CBC AUTOMATED: CPT

## 2019-09-27 PROCEDURE — 700105 HCHG RX REV CODE 258: Performed by: OBSTETRICS & GYNECOLOGY

## 2019-09-27 PROCEDURE — 90686 IIV4 VACC NO PRSV 0.5 ML IM: CPT | Performed by: OBSTETRICS & GYNECOLOGY

## 2019-09-27 PROCEDURE — 36415 COLL VENOUS BLD VENIPUNCTURE: CPT

## 2019-09-27 PROCEDURE — 304965 HCHG RECOVERY SERVICES

## 2019-09-27 PROCEDURE — 90471 IMMUNIZATION ADMIN: CPT

## 2019-09-27 PROCEDURE — 59400 OBSTETRICAL CARE: CPT | Performed by: OBSTETRICS & GYNECOLOGY

## 2019-09-27 PROCEDURE — 3E02340 INTRODUCTION OF INFLUENZA VACCINE INTO MUSCLE, PERCUTANEOUS APPROACH: ICD-10-PCS | Performed by: OBSTETRICS & GYNECOLOGY

## 2019-09-27 RX ORDER — IBUPROFEN 600 MG/1
600 TABLET ORAL EVERY 6 HOURS PRN
Status: DISCONTINUED | OUTPATIENT
Start: 2019-09-27 | End: 2019-09-28 | Stop reason: HOSPADM

## 2019-09-27 RX ORDER — SODIUM CHLORIDE, SODIUM LACTATE, POTASSIUM CHLORIDE, CALCIUM CHLORIDE 600; 310; 30; 20 MG/100ML; MG/100ML; MG/100ML; MG/100ML
INJECTION, SOLUTION INTRAVENOUS PRN
Status: DISCONTINUED | OUTPATIENT
Start: 2019-09-27 | End: 2019-09-28 | Stop reason: HOSPADM

## 2019-09-27 RX ORDER — MISOPROSTOL 200 UG/1
600 TABLET ORAL
Status: DISCONTINUED | OUTPATIENT
Start: 2019-09-27 | End: 2019-09-28 | Stop reason: HOSPADM

## 2019-09-27 RX ORDER — METHYLERGONOVINE MALEATE 0.2 MG/ML
0.2 INJECTION INTRAVENOUS
Status: DISCONTINUED | OUTPATIENT
Start: 2019-09-27 | End: 2019-09-28 | Stop reason: HOSPADM

## 2019-09-27 RX ORDER — DOCUSATE SODIUM 100 MG/1
100 CAPSULE, LIQUID FILLED ORAL 2 TIMES DAILY PRN
Status: DISCONTINUED | OUTPATIENT
Start: 2019-09-27 | End: 2019-09-28 | Stop reason: HOSPADM

## 2019-09-27 RX ORDER — BISACODYL 10 MG
10 SUPPOSITORY, RECTAL RECTAL PRN
Status: DISCONTINUED | OUTPATIENT
Start: 2019-09-27 | End: 2019-09-28 | Stop reason: HOSPADM

## 2019-09-27 RX ORDER — HYDROCODONE BITARTRATE AND ACETAMINOPHEN 5; 325 MG/1; MG/1
1 TABLET ORAL EVERY 4 HOURS PRN
Status: DISCONTINUED | OUTPATIENT
Start: 2019-09-27 | End: 2019-09-28 | Stop reason: HOSPADM

## 2019-09-27 RX ORDER — VITAMIN A ACETATE, BETA CAROTENE, ASCORBIC ACID, CHOLECALCIFEROL, .ALPHA.-TOCOPHEROL ACETATE, DL-, THIAMINE MONONITRATE, RIBOFLAVIN, NIACINAMIDE, PYRIDOXINE HYDROCHLORIDE, FOLIC ACID, CYANOCOBALAMIN, CALCIUM CARBONATE, FERROUS FUMARATE, ZINC OXIDE, CUPRIC OXIDE 3080; 12; 120; 400; 1; 1.84; 3; 20; 22; 920; 25; 200; 27; 10; 2 [IU]/1; UG/1; MG/1; [IU]/1; MG/1; MG/1; MG/1; MG/1; MG/1; [IU]/1; MG/1; MG/1; MG/1; MG/1; MG/1
1 TABLET, FILM COATED ORAL EVERY MORNING
Status: DISCONTINUED | OUTPATIENT
Start: 2019-09-27 | End: 2019-09-28 | Stop reason: HOSPADM

## 2019-09-27 RX ORDER — IBUPROFEN 800 MG/1
800 TABLET ORAL EVERY 6 HOURS PRN
Status: CANCELLED | OUTPATIENT
Start: 2019-09-27

## 2019-09-27 RX ORDER — ACETAMINOPHEN 325 MG/1
650 TABLET ORAL EVERY 4 HOURS PRN
Status: CANCELLED | OUTPATIENT
Start: 2019-09-27

## 2019-09-27 RX ORDER — CARBOPROST TROMETHAMINE 250 UG/ML
250 INJECTION, SOLUTION INTRAMUSCULAR
Status: DISCONTINUED | OUTPATIENT
Start: 2019-09-27 | End: 2019-09-28 | Stop reason: HOSPADM

## 2019-09-27 RX ORDER — ACETAMINOPHEN 650 MG/1
650 SUPPOSITORY RECTAL EVERY 4 HOURS PRN
Status: CANCELLED | OUTPATIENT
Start: 2019-09-27

## 2019-09-27 RX ADMIN — VITAMIN A, VITAMIN C, VITAMIN D-3, VITAMIN E, VITAMIN B-1, VITAMIN B-2, NIACIN, VITAMIN B-6, CALCIUM, IRON, ZINC, COPPER 1 TABLET: 4000; 120; 400; 22; 1.84; 3; 20; 10; 1; 12; 200; 27; 25; 2 TABLET ORAL at 06:02

## 2019-09-27 RX ADMIN — HYDROCODONE BITARTRATE AND ACETAMINOPHEN 1 TABLET: 5; 325 TABLET ORAL at 03:23

## 2019-09-27 RX ADMIN — IBUPROFEN 600 MG: 600 TABLET ORAL at 10:34

## 2019-09-27 RX ADMIN — OXYTOCIN 125 ML/HR: 10 INJECTION, SOLUTION INTRAMUSCULAR; INTRAVENOUS at 04:14

## 2019-09-27 RX ADMIN — IBUPROFEN 600 MG: 600 TABLET ORAL at 03:24

## 2019-09-27 RX ADMIN — FENTANYL CITRATE 100 MCG: 50 INJECTION INTRAMUSCULAR; INTRAVENOUS at 01:24

## 2019-09-27 RX ADMIN — SODIUM CHLORIDE, POTASSIUM CHLORIDE, SODIUM LACTATE AND CALCIUM CHLORIDE: 600; 310; 30; 20 INJECTION, SOLUTION INTRAVENOUS at 01:24

## 2019-09-27 RX ADMIN — INFLUENZA A VIRUS A/BRISBANE/02/2018 IVR-190 (H1N1) ANTIGEN (FORMALDEHYDE INACTIVATED), INFLUENZA A VIRUS A/KANSAS/14/2017 X-327 (H3N2) ANTIGEN (FORMALDEHYDE INACTIVATED), INFLUENZA B VIRUS B/PHUKET/3073/2013 ANTIGEN (FORMALDEHYDE INACTIVATED), AND INFLUENZA B VIRUS B/MARYLAND/15/2016 BX-69A ANTIGEN (FORMALDEHYDE INACTIVATED) 0.5 ML: 15; 15; 15; 15 INJECTION, SUSPENSION INTRAMUSCULAR at 06:02

## 2019-09-27 RX ADMIN — OXYTOCIN 2000 ML/HR: 10 INJECTION, SOLUTION INTRAMUSCULAR; INTRAVENOUS at 02:48

## 2019-09-27 ASSESSMENT — EDINBURGH POSTNATAL DEPRESSION SCALE (EPDS)
I HAVE BEEN ABLE TO LAUGH AND SEE THE FUNNY SIDE OF THINGS: AS MUCH AS I ALWAYS COULD
I HAVE FELT SAD OR MISERABLE: NO, NOT AT ALL
THE THOUGHT OF HARMING MYSELF HAS OCCURRED TO ME: NEVER
I HAVE BEEN ANXIOUS OR WORRIED FOR NO GOOD REASON: NO, NOT AT ALL
I HAVE BEEN SO UNHAPPY THAT I HAVE BEEN CRYING: NO, NEVER
I HAVE BLAMED MYSELF UNNECESSARILY WHEN THINGS WENT WRONG: NOT VERY OFTEN
I HAVE BEEN SO UNHAPPY THAT I HAVE HAD DIFFICULTY SLEEPING: NOT AT ALL
THINGS HAVE BEEN GETTING ON TOP OF ME: NO, I HAVE BEEN COPING AS WELL AS EVER
I HAVE LOOKED FORWARD WITH ENJOYMENT TO THINGS: AS MUCH AS I EVER DID
I HAVE FELT SCARED OR PANICKY FOR NO GOOD REASON: NO, NOT MUCH

## 2019-09-27 ASSESSMENT — PATIENT HEALTH QUESTIONNAIRE - PHQ9
1. LITTLE INTEREST OR PLEASURE IN DOING THINGS: NOT AT ALL
2. FEELING DOWN, DEPRESSED, IRRITABLE, OR HOPELESS: NOT AT ALL
SUM OF ALL RESPONSES TO PHQ9 QUESTIONS 1 AND 2: 0

## 2019-09-27 NOTE — PROGRESS NOTES
Pt to L&D for scheduled IOL. Admission profile complete. IV started, labs sent. POC discussed, questions answered. Vertex presentation confirmed via u/s by Dr. Bailey  Cytotec placed per MAR  1825 - Dr. Patsor at bedside to place balloon  1900 - Report to MARIYA Beauchamp RN

## 2019-09-27 NOTE — CARE PLAN
Problem: Infection  Goal: Will remain free from infection  Outcome: PROGRESSING AS EXPECTED  Note:   Pt remains free from s/s of infection     Problem: Knowledge Deficit  Goal: Knowledge of disease process/condition, treatment plan, diagnostic tests, and medications will improve  Outcome: PROGRESSING AS EXPECTED  Note:   POC discussed with pt, pt verbalizes understanding     Problem: Pain Management  Goal: Pain level will decrease to patient's comfort goal  Outcome: PROGRESSING AS EXPECTED  Note:   Pt educated on pain medications/interventions available to her, pt will notify RN if she needs medication or intervention

## 2019-09-27 NOTE — PROGRESS NOTES
Labor Progress Note    Marian Dee       IUP@41w0d  Admission for induction of labor for post EDC status  Unfavorable cervix status post Cytotec x1 dose      Subjective:  Patient reports some mild to moderate intensity contractions that are still irregular.  She received 1 dose of Cytotec 4 hours ago and cervix is more not need any change.  I discussed cervical balloon for mechanical dilation and patient agrees for balloon placement.      Objective:   Vitals:    09/26/19 1100 09/26/19 1141   BP:  125/75   Pulse:  90   Weight: 72.6 kg (159 lb 15.8 oz) 72.6 kg (160 lb)   Height:  1.524 m (5')     Fetal heart variability: moderate.  Baseline is 130s with accelerations and rare variable decelerations. [place]: Q3-8  Cervical: 1/50/-2    Membranes ruptured: no    Meds:   Cytotec    Labs:  Recent Results (from the past 24 hour(s))   Hold Blood Bank Specimen (Not Tested)    Collection Time: 09/26/19 12:40 PM   Result Value Ref Range    Holding Tube - Bb DONE    CBC WITH DIFFERENTIAL    Collection Time: 09/26/19 12:40 PM   Result Value Ref Range    WBC 11.9 (H) 4.8 - 10.8 K/uL    RBC 4.17 (L) 4.20 - 5.40 M/uL    Hemoglobin 11.6 (L) 12.0 - 16.0 g/dL    Hematocrit 36.9 (L) 37.0 - 47.0 %    MCV 88.5 81.4 - 97.8 fL    MCH 27.8 27.0 - 33.0 pg    MCHC 31.4 (L) 33.6 - 35.0 g/dL    RDW 45.5 35.9 - 50.0 fL    Platelet Count 246 164 - 446 K/uL    MPV 11.3 9.0 - 12.9 fL    Neutrophils-Polys 71.40 44.00 - 72.00 %    Lymphocytes 17.40 (L) 22.00 - 41.00 %    Monocytes 9.00 0.00 - 13.40 %    Eosinophils 0.90 0.00 - 6.90 %    Basophils 0.30 0.00 - 1.80 %    Immature Granulocytes 1.00 (H) 0.00 - 0.90 %    Nucleated RBC 0.00 /100 WBC    Neutrophils (Absolute) 8.52 (H) 2.00 - 7.15 K/uL    Lymphs (Absolute) 2.08 1.00 - 4.80 K/uL    Monos (Absolute) 1.08 (H) 0.00 - 0.85 K/uL    Eos (Absolute) 0.11 0.00 - 0.51 K/uL    Baso (Absolute) 0.03 0.00 - 0.12 K/uL    Immature Granulocytes (abs) 0.12 (H) 0.00 - 0.11 K/uL    NRBC (Absolute)  0.00 K/uL       Assessment:   IUP@ 41w0d  Admission for IOL due to post EDC  Unfavorable cervical exam-patient is status post Cytotec x1 dose for cervical ripening but is vicente too frequently for an order dose of Cytotec  Fetal heart rate tracing is reassuring/reactive NST    Plan:  I discussed placement of cervical balloon for mechanical cervical dilation and patient agrees  We will set patient up for cervical balloon placement  Plan of care discussed      Colin Tenorio

## 2019-09-27 NOTE — PROGRESS NOTES
Physician Labor Progress Note:    S: Pt reports feeling more uncomfortable.  Feeling contractions, no LOF, no VB, +FM.    Vitals:    19 2023 19 2108 19 2154 19 2238   BP: 114/82 130/85 119/78 110/62   Pulse: 79 79 93 74   Resp:       Temp:       TempSrc:       Weight:       Height:           Gen: NAD  Pulm: nonlabored breathing on RA  SVE: 6/70/-2, AROM clear, scant  Ext: no edema    FHT: baseline 130, moderate variability accels present no decels  toco: ctx q3-6      A/P: 23 y.o.  @ 41w0d admitted for IOl for late term  #labor: undergoing IOL, on Pit now s/p AROM- continue to titrate Pitocin.  #FHT: cat 1  #Pain management: unsure, aware of options, natural with prior delivery  #GBS: neg   #Rh +, RI  #Postpartum planning.  Plans to breastfeed.  Contraception: not addressed at this encounter    Daysi Gresham DO  [facility], Women's Health

## 2019-09-27 NOTE — PROGRESS NOTES
Admitted from labor and delivery, post vag delivery in satisfactory condition. Came via wheelchair and placed to bed comfortably. IVF of LR with 20 Units of Pitocin infusing well into left hand. Assessment done, fundus firm at Umbilicus, lochia lochia rubra minimal. Denies pain at this time, plan of care on going. Oriented to room and call light place within reach. Will continue to monitor.

## 2019-09-27 NOTE — L&D DELIVERY NOTE
Delivery Note for Vaginal Delivery     Stage 1:  23 y.o. y/o  at 41w1d weeks admitted for late term induction of labor. Her pregnancy has been complicated by elevated 1 hr with normal 3 hr. Her labor was induced with misoprostol, COOK, AROM and Pitocin.  The patient was noted to be GBS negative.  Fetal monitoring during labor was overall category 2.  Patient had IV medications for pain control.     Stage II: At about 0225, she was noted to be complete.  She then pushed for about 20 minutes to deliver a  viable, vigorous female infant on 19 at 0245.  The delivery was uncomplicated. Shoulders were delivered easily.  The infant was placed immediately upon mother’s abdomen.  Apgars at 1 and 5 minutes were 8/9 and the infant has not yet been weighed.    Stage III: Attention was then turned to active management of the third stage. The placenta was delivered spontaneously with gentle manual traction on the umbilical cord with countertraction maintained suprapubically.  On inspection, the placenta was intact and had normal insertion.  Upon delivery of the placenta, good hemostasis was noted with fundal massage and a 40 unit bolus of pitocin in IV infusion was administered per protocol.     Laceration repair: The perineum was inspected following delivery. The patient did not have any lacerations.     Estimated blood loss for the above procedures was 200cc.     Mother and infant in stable condition, and family pleased with birth.     Daysi Gresham DO  [facility], Women's Health

## 2019-09-27 NOTE — CARE PLAN
Problem: Altered physiologic condition related to immediate post-delivery state and potential for bleeding/hemorrhage  Goal: Patient physiologically stable as evidenced by normal lochia, palpable uterine involution and vital signs within normal limits  Outcome: PROGRESSING AS EXPECTED  Note:   Fundus firm at U, lochia rubra minimal. V/S WNL     Problem: Alteration in comfort related to episiotomy, vaginal repair and/or after birth pains  Goal: Patient verbalizes acceptable pain level  Outcome: PROGRESSING AS EXPECTED  Note:   Pain controlled at this time, will be medicated as needed.

## 2019-09-27 NOTE — PROGRESS NOTES
Assessment done. Pt. Has no c/o pain.Fundus firm.Lochia light. Pt. Caring for baby with good skill. patient working on breastfeeding.Pt. Has received information on education .plan of care for today discussed.

## 2019-09-27 NOTE — PROGRESS NOTES
190 Report received, pt care assumed.   Dr. Gresham at bedside   /-3. Cooks balloon inserted per MD 60ml uterine 40ml Vaginal.   Pt wanting medication for pain. Balloon out. /-2  0120 0120 SVE 6-7/70/-2  0130 Dr. Gresham updated on pt status.  0220 SVE 8/9/100/0 Pt feeling urge to push unable to hold back from pushing. Dr. Gresham called to room for delivery  225 Dr. Gresham at bedside  0230 SVE 10100/0. Pushing  0245  viable female infant.  0248 Placenta delivered, pitocin bolus started.   0430 Pt assisted up to bathroom. Steady gait. +void. Pt taught how to use donavan bottle, pt demonstrated understanding. Donavan care done. New donavan pad and gown to pt.   0440 Pt transferred to PP room 344 via wheel chair and assisted to pp bed. Report to JAN Alvarez, PP.

## 2019-09-27 NOTE — CARE PLAN
Problem: Alteration in comfort related to episiotomy, vaginal repair and/or after birth pains  Goal: Patient is able to ambulate, care for self and infant  Outcome: PROGRESSING AS EXPECTED  Note:   Pain level rechecked within 1 hour and patient states pain medicine is effective with pain control.patient demonstrates improved ease of movement patient caring for baby and self with good skill.      Problem: Potential knowledge deficit related to lack of understanding of self and  care  Goal: Patient will demonstrate ability to care for self and infant  Outcome: PROGRESSING AS EXPECTED  Note:   Patient demonstrates skills in baby care and self care. Patient able to feed,change diapers and comfort baby. Patient demonstrates good hygiene for self and baby.

## 2019-09-27 NOTE — PROGRESS NOTES
Attending Physician Labor Progress Note:  Assuming care of Ms. Dunaway who is a 23 y.o.  41w0d  here for IOL for term.  Pregnancy complicated by elevated 1hr GTT with normal 3hr.  Induction started with miso.    S: Pt reports feeling fine.  Feeling contractions irregularly, no LOF, no VB, +FM.    Vitals:    19 1141 19 1902 19 1903 19   BP: 125/75  126/82 114/82   Pulse: 90  83 79   Resp:  18     Temp:  36.2 °C (97.1 °F)     TempSrc:  Temporal     Weight: 72.6 kg (160 lb)      Height: 1.524 m (5')          Gen: NAD  Pulm: nonlabored breathing on RA  SVE: /-3, COOK placed  Ext: no edema    FHT: 130/mod variability/+ accels/rare small decels  toco: irreg ctx       A/P: 23 y.o.  @ 41w0d admitted for IOl for late term  #labor: undergoing IOL- now with COOK.  Will start pitocin as well.  Plan to check around cook every 4 hours.  #FHT: cat 1  #Pain management: unsure - likely nothing  #GBS: neg   #Rh +, RI  #Postpartum planning.  Plans to breastfeed.  Contraception: not addressed at this encounter    DO [facility], Women's Health

## 2019-09-28 VITALS
RESPIRATION RATE: 18 BRPM | OXYGEN SATURATION: 91 % | WEIGHT: 143.52 LBS | HEIGHT: 60 IN | SYSTOLIC BLOOD PRESSURE: 105 MMHG | HEART RATE: 82 BPM | BODY MASS INDEX: 28.18 KG/M2 | TEMPERATURE: 98.2 F | DIASTOLIC BLOOD PRESSURE: 70 MMHG

## 2019-09-28 PROBLEM — Z34.83 ENCOUNTER FOR SUPERVISION OF OTHER NORMAL PREGNANCY, THIRD TRIMESTER: Status: RESOLVED | Noted: 2019-08-13 | Resolved: 2019-09-28

## 2019-09-28 PROBLEM — Z34.80 SUPERVISION OF OTHER NORMAL PREGNANCY, ANTEPARTUM: Status: RESOLVED | Noted: 2019-05-16 | Resolved: 2019-09-28

## 2019-09-28 PROCEDURE — A9270 NON-COVERED ITEM OR SERVICE: HCPCS | Performed by: OBSTETRICS & GYNECOLOGY

## 2019-09-28 PROCEDURE — 700102 HCHG RX REV CODE 250 W/ 637 OVERRIDE(OP): Performed by: OBSTETRICS & GYNECOLOGY

## 2019-09-28 PROCEDURE — 700112 HCHG RX REV CODE 229: Performed by: OBSTETRICS & GYNECOLOGY

## 2019-09-28 RX ORDER — ACETAMINOPHEN 500 MG
500-1000 TABLET ORAL EVERY 6 HOURS PRN
Qty: 30 TAB | Refills: 0 | Status: SHIPPED | OUTPATIENT
Start: 2019-09-28 | End: 2020-01-24

## 2019-09-28 RX ORDER — VITAMIN A ACETATE, BETA CAROTENE, ASCORBIC ACID, CHOLECALCIFEROL, .ALPHA.-TOCOPHEROL ACETATE, DL-, THIAMINE MONONITRATE, RIBOFLAVIN, NIACINAMIDE, PYRIDOXINE HYDROCHLORIDE, FOLIC ACID, CYANOCOBALAMIN, CALCIUM CARBONATE, FERROUS FUMARATE, ZINC OXIDE, CUPRIC OXIDE 3080; 12; 120; 400; 1; 1.84; 3; 20; 22; 920; 25; 200; 27; 10; 2 [IU]/1; UG/1; MG/1; [IU]/1; MG/1; MG/1; MG/1; MG/1; MG/1; [IU]/1; MG/1; MG/1; MG/1; MG/1; MG/1
1 TABLET, FILM COATED ORAL EVERY MORNING
Qty: 60 TAB | Refills: 1 | Status: SHIPPED | OUTPATIENT
Start: 2019-09-29 | End: 2020-06-12

## 2019-09-28 RX ORDER — IBUPROFEN 600 MG/1
600 TABLET ORAL EVERY 6 HOURS PRN
Qty: 60 TAB | Refills: 0 | Status: SHIPPED | OUTPATIENT
Start: 2019-09-28 | End: 2020-01-24

## 2019-09-28 RX ADMIN — VITAMIN A, VITAMIN C, VITAMIN D-3, VITAMIN E, VITAMIN B-1, VITAMIN B-2, NIACIN, VITAMIN B-6, CALCIUM, IRON, ZINC, COPPER 1 TABLET: 4000; 120; 400; 22; 1.84; 3; 20; 10; 1; 12; 200; 27; 25; 2 TABLET ORAL at 08:49

## 2019-09-28 RX ADMIN — DOCUSATE SODIUM 100 MG: 100 CAPSULE, LIQUID FILLED ORAL at 08:49

## 2019-09-28 NOTE — LACTATION NOTE
This note was copied from a baby's chart.  Asked by mother to check baby's latch. She was sitting on the side of the bed unsupported trying to latch baby in cradle. Had Mom sit in a chair, placed pillows to support her baby and her arms and showed her how to do cross-cradle hold and have more control over latch. Baby latched with a wide mouth quickly, showed her how to snug baby's chin into her breast in a straighter latch. She stated that baby's latch felt better on her nipple/areola.     Mom has P insurance and was going to get a home pump after discharge. Discussed the importance of having baby nurse instead of pumping for at least the first 2 weeks. Encouraged her to make an LC appt if she feels that she needs more lactation help and to see them to make a pumping plan when/if she goes back to work.

## 2019-09-28 NOTE — CARE PLAN
Problem: Safety  Goal: Will remain free from injury  Outcome: PROGRESSING AS EXPECTED  Note:   Pt re-educated about use of call light if any assistance is needed. Pt. Is able to ambulate without any assistance at this time.      Problem: Infection  Goal: Will remain free from infection  Outcome: PROGRESSING AS EXPECTED  Note:   Pt remains afebrile at this time. Pt. Shows no other s/s of infection.

## 2019-09-28 NOTE — PROGRESS NOTES
Assessment done. Pt. Has no c/o pain.Fundus firm.Lochia light.Pt. Caring for baby with good skill. Breastfeeding going well.Pt. Has received information on education .planning on discharge today.

## 2019-09-28 NOTE — PROGRESS NOTES
1900-- Received report from JAN Langley, Infant at bedside in open crib no signs of distress.  Pt resting in bed. Discussed pain management for the day.  No further needs at the time.  Call light within reach, bed locked and in lowest position.  Rounding in place.    2200-- Assessment completed, VSS, pt declines the need for pain intervention at this time.  Discussed plan of care for the night that pt is comfortable with.  All questions answered at this time.  Will continue to monitor.

## 2019-09-28 NOTE — DISCHARGE SUMMARY
Discharge Summary:      Marian Dee    Admit Date:   2019  Discharge Date:  2019     Admitting diagnosis:  Pregnancy  Labor and delivery indication for care or intervention  Post-dates pregnancy  Discharge Diagnosis: Status post vaginal, spontaneous.  Pregnancy Complications: none  Tubal Ligation:  N\A        History:  Past Medical History:   Diagnosis Date   • History of hepatitis B     in childhood     OB History    Para Term  AB Living   3 2 2   1 2   SAB TAB Ectopic Molar Multiple Live Births     1     0 2      # Outcome Date GA Lbr Janes/2nd Weight Sex Delivery Anes PTL Lv   3 Term 19 41w1d 04:38 / 00:15 3.255 kg (7 lb 2.8 oz) F Vag-Spont None N ISAIAH   2 Term 09/20/15 40w0d  3.164 kg (6 lb 15.6 oz) M Vag-Spont None N ISAIAH   1 TAB 10/2013 12w0d               Patient has no known allergies.  Patient Active Problem List    Diagnosis Date Noted   •  (spontaneous vaginal delivery) 2019        Hospital Course:   23 y.o. , now para 2, was admitted with the above mentioned diagnosis, underwent Induction of Labor, . Patient postpartum course was unremarkable, with progressive advancement in diet , ambulation and toleration of oral analgesia. Patient without complaints today and desires discharge.      Vitals:    19 1000 19 1400 19 1800 19 0536   BP: 103/63 (!) 91/50 107/65 105/70   Pulse: 78 97 84 82   Resp: 18 18 18 18   Temp: 36.6 °C (97.8 °F) 36.9 °C (98.4 °F) 36.7 °C (98.1 °F) 36.8 °C (98.2 °F)   TempSrc: Temporal Temporal Temporal Temporal   SpO2: 95% 94% 95% 91%   Weight:       Height:           Current Facility-Administered Medications   Medication Dose   • oxytocin (PITOCIN) infusion (for postpartum)   mL/hr   • ibuprofen (MOTRIN) tablet 600 mg  600 mg   • HYDROcodone-acetaminophen (NORCO) 5-325 MG per tablet 1 Tab  1 Tab   • LR infusion     • PRN oxytocin (PITOCIN) (20 Units/1000 mL) PRN for excessive uterine bleeding  - See Admin Instr  125-999 mL/hr   • miSOPROStol (CYTOTEC) tablet 600 mcg  600 mcg   • methylergonovine (METHERGINE) injection 0.2 mg  0.2 mg   • carboPROST (HEMABATE) injection 250 mcg  250 mcg   • docusate sodium (COLACE) capsule 100 mg  100 mg   • bisacodyl (DULCOLAX) suppository 10 mg  10 mg   • magnesium hydroxide (MILK OF MAGNESIA) suspension 30 mL  30 mL   • prenatal plus vitamin (STUARTNATAL 1+1) 27-1 MG tablet 1 Tab  1 Tab   • ondansetron (ZOFRAN ODT) dispertab 4 mg  4 mg    Or   • ondansetron (ZOFRAN) syringe/vial injection 4 mg  4 mg   • oxytocin (PITOCIN) 20 UNITS/1000ML LR (induction of labor)  0.5-20 domenic-units/min       Exam:  Breast Exam: negative  Abdomen: Abdomen soft, non-tender. BS normal. No masses,  No organomegaly  Fundus Non Tender: yes  Incision: none  Perineum: perineum intact  Extremity: extremities, peripheral pulses and reflexes normal, no edema, redness or tenderness in the calves or thighs, Homans sign is negative, no sign of DVT     Labs:  Recent Labs     09/26/19  1240 09/27/19  1142   WBC 11.9* 22.2*   RBC 4.17* 4.08*   HEMOGLOBIN 11.6* 11.1*   HEMATOCRIT 36.9* 35.3*   MCV 88.5 86.5   MCH 27.8 27.2   MCHC 31.4* 31.4*   RDW 45.5 44.4   PLATELETCT 246 219   MPV 11.3 10.9        Activity:   Discharge to home  Pelvic Rest x 6 weeks    Assessment:  normal postpartum course and good candidate for condoms  Discharge Assessment: No heavy bleeding or foul vaginal discharge      Follow up: .[facility] or [facility] in 5 weeks for vaginal ; 1 week for incision check.      Discharge Meds:   Current Outpatient Medications   Medication Sig Dispense Refill   • ibuprofen (MOTRIN) 600 MG Tab Take 1 Tab by mouth every 6 hours as needed (For cramping after delivery; do not give if patient is receiving ketorolac (Toradol)). 60 Tab 0   • acetaminophen (TYLENOL) 500 MG Tab Take 1-2 Tabs by mouth every 6 hours as needed. 30 Tab 0       CHRISTIANO Dempsey.

## 2019-09-28 NOTE — DISCHARGE INSTRUCTIONS
POSTPARTUM DISCHARGE INSTRUCTIONS FOR MOM    YOB: 1995   Age: 23 y.o.               Admit Date: 2019     Discharge Date: 2019  Attending Doctor:  Colin Tenorio M.D.                  Allergies:  Patient has no known allergies.    Discharged to home by car. Discharged via wheelchair, hospital escort: Yes.  Special equipment needed: Not Applicable  Belongings with: Personal  Be sure to schedule a follow-up appointment with your primary care doctor or any specialists as instructed.     Discharge Plan:   Diet Plan: Discussed  Activity Level: Discussed  Confirmed Follow up Appointment: Patient to Call and Schedule Appointment  Confirmed Symptoms Management: Discussed  Influenza Vaccine Indication: Indicated: 9 to 64 years of age  Influenza Vaccine Given - only chart on this line when given: Influenza Vaccine Given (See MAR)    REASONS TO CALL YOUR OBSTETRICIAN:  1.   Persistent fever or shaking chills (Temperature higher than 100.4)  2.   Heavy bleeding (soaking more than 1 pad per hour); Passing clots  3.   Foul odor from vagina  4.   Mastitis (Breast infection; breast pain, chills, fever, redness)  5.   Urinary pain, burning or frequency  6.   Episiotomy infection  7.   Abdominal incision infection  8.   Severe depression longer than 24 hours    HAND WASHING  · Prior to handling the baby.  · Before breastfeeding or bottle feeding baby.  · After using the bathroom or changing the baby's diaper.    WOUND CARE  Ask your physician for additional care instructions.  In general:    ·  Incision:      · Keep clean and dry.    · Do NOT lift anything heavier than your baby for up to 6 weeks.    · There should not be any opening or pus.      VAGINAL CARE  · Nothing inside vagina for 6 weeks: no sexual intercourse, tampons or douching.  · Bleeding may continue for 2-4 weeks.  Amount may vary.    · Call your physician for heavy bleeding which means soaking more than 1 pad per hour    BIRTH  "CONTROL  · It is possible to become pregnant at any time after delivery and while breastfeeding.  · Plan to discuss a method of birth control with your physician at your follow up visit. visit.    DIET AND ELIMINATION  · Eating more fiber (bran cereal, fruits, and vegetables) and drinking plenty of fluids will help to avoid constipation.  · Urinary frequency after childbirth is normal.    POSTPARTUM BLUES  During the first few days after birth, you may experience a sense of the \"blues\" which may include impatience, irritability or even crying.  These feeling come and go quickly.  However, as many as 1 in 10 women experience emotional symptoms known as postpartum depression.    Postpartum depression:  May start as early as the second or third day after delivery or take several weeks or months to develop.  Symptoms of \"blues\" are present, but are more intense:  Crying spells; loss of appetite; feelings of hopelessness or loss of control; fear of touching the baby; over concern or no concern at all about the baby; little or no concern about your own appearance/caring for yourself; and/or inability to sleep or excessive sleeping.  Contact your physician if you are experiencing any of these symptoms.    Crisis Hotline:  · [place] Crisis Hotline:  8-010-JTLNNHN  Or 1-147.892.5468  · [place] Crisis Hotline:  1-177.126.6961  Or 214-505-0597    PREVENTING SHAKEN BABY:  If you are angry or stressed, PUT THE BABY IN THE CRIB, step away, take some deep breaths, and wait until you are calm to care for the baby.  DO NOT SHAKE THE BABY.  You are not alone, call a supporter for help.    · Crisis [facility] Center 24/7 crisis line 231-855-6523 or 1-522.429.2589  · You can also text them, text \"ANSWER\" to 313528    QUIT SMOKING/TOBACCO USE:  I understand the use of any tobacco products increases my chance of suffering from future heart disease and could cause other illnesses which may shorten my life. Quitting the use of tobacco products " is the single most important thing I can do to improve my health. For further information on smoking / tobacco cessation call a Toll Free Quit Line at 1-634.462.3626 (*[employer] [place]) or 1-549.855.7441 (American Lung Association) or you can access the web based program at www.lungusa.org.    · [place] Tobacco Users Help Line:  (243) 574-5110       Toll Free: 1-849.844.6348  · Quit Tobacco Program [facility] (648)904-8003    DEPRESSION / SUICIDE RISK:  As [facility] are [facility], it is important to learn how to keep safe from harming yourself.    Recognize the warning signs:  · Abrupt changes in personality, positive or negative- including increase in energy   · Giving away possessions  · Change in eating patterns- significant weight changes-  positive or negative  · Change in sleeping patterns- unable to sleep or sleeping all the time   · Unwillingness or inability to communicate  · Depression  · Unusual sadness, discouragement and loneliness  · Talk of wanting to die  · Neglect of personal appearance   · Rebelliousness- reckless behavior  · Withdrawal from people/activities they love  · Confusion- inability to concentrate     If you or a loved one observes any of these behaviors or has concerns about self-harm, here's what you can do:  · Talk about it- your feelings and reasons for harming yourself  · Remove any means that you might use to hurt yourself (examples: pills, rope, extension cords, firearm)  · Get professional help from the community (Mental Health, Substance Abuse, psychological counseling)  · Do not be alone:Call your Safe Contact- someone whom you trust who will be there for you.  · Call your local CRISIS HOTLINE 383-5281 or 609-971-7724  · Call your local Children's Mobile Crisis Response Team [place] (549) 734-8354 or www.Rowl  · Call the toll free National Suicide Prevention Hotlines   · National Suicide Prevention  Lifeline 337-891-ZGYN (8809)  · [facility] 800-SUICIDE (756-1820)    DISCHARGE SURVEY:  Thank you for choosing [facility].  We hope we provided you with very good care.  You may be receiving a survey in the mail.  Please fill it out.  Your opinion is valuable to us.    ADDITIONAL EDUCATIONAL MATERIALS GIVEN TO PATIENT:        My signature on this form indicates that:  1.  I have reviewed and understand the above information  2.  My questions regarding this information have been answered to my satisfaction.  3.  I have formulated a plan with my discharge nurse to obtain my prescribed medication for home.

## 2019-09-30 ENCOUNTER — TELEPHONE (OUTPATIENT)
Dept: OBGYN | Facility: CLINIC | Age: 24
End: 2019-09-30

## 2019-09-30 NOTE — TELEPHONE ENCOUNTER
09/30/19 10:29 AM    [facility] called wants Rx of breast pump for pt. Pended order to Yaritza Ring to print out and can fax to [facility]

## 2019-10-08 ENCOUNTER — TELEPHONE (OUTPATIENT)
Dept: OBGYN | Facility: CLINIC | Age: 24
End: 2019-10-08

## 2019-10-08 NOTE — TELEPHONE ENCOUNTER
----- Message from Marian Dunaway sent at 10/6/2019  6:14 PM PDT -----  Regarding: Non-Urgent Medical Question  Contact: [phone]  Hi I have a question   So I just had a baby and I was wondering if I can get the postpartum belt from [facility] who to contact    10/8/19 1012 Consulted with midwife Arlene Patton and she stated usually these are given to patients that deliver via . Spoke with pt, stated she knows a few people that got the belts after their delivery. Notified pt of above and adv her she can get a girdle from any department store. Pt understood and had no further questions

## 2019-10-11 RX ORDER — BREAST PUMP
1 EACH MISCELLANEOUS ONCE
Qty: 1 EACH | Refills: 0 | Status: SHIPPED | OUTPATIENT
Start: 2019-10-11 | End: 2019-10-11

## 2019-11-12 ENCOUNTER — POST PARTUM (OUTPATIENT)
Dept: OBGYN | Facility: MEDICAL CENTER | Age: 24
End: 2019-11-12
Payer: COMMERCIAL

## 2019-11-12 VITALS — SYSTOLIC BLOOD PRESSURE: 114 MMHG | DIASTOLIC BLOOD PRESSURE: 78 MMHG | BODY MASS INDEX: 27.93 KG/M2 | WEIGHT: 143 LBS

## 2019-11-12 PROCEDURE — 90050 PR POSTPARTUM VISIT: CPT | Performed by: NURSE PRACTITIONER

## 2019-11-13 NOTE — PROGRESS NOTES
Subjective   Subjective:    Marian Dee is a 23 y.o. female who presents for her postpartum exam s/p induction of labor, spontaneous vaginal delivery with no laceration. . Her prenatal course was uncomplicated. Her postpartum course was complicated by tailbone pain. She denies dysuria, vaginal bleeding, odor, itching or breast problems. She is both breast and bottle feeding. She does not want contraception at this time. Reports not having sex prior to this appointment. Eating a regular diet without difficulty. Bowel movement are Normal.  The patient is not having any pain. No current menses. Patient denies postpartum depression.     Problem List     Patient Active Problem List    Diagnosis Date Noted   •  (spontaneous vaginal delivery) 2019       Objective    Patient declines Physical examination today.   Lab: H&H upon discharge 11.1/35.3  Weight 143 lb  Vitals /78    Assessment   Assessment:    1. PP care of lactating women   2. Exam WNL   3. Pap WNL   4. Desires contraception - not at this time.     Plan   Plan:    1. Breastfeeding support   2. Continue PNV   3. Contraceptive counseling - She will let us know if she desires anything at this time.   4. Encouraged condom use   5. Discussed diet, exercise and resumption of sexual activity   6. Preconception guidance for next pregnancy if applicable. No specific risk factors. Folic acid for all women of childbearing age.   7.  Follow up needed - continue annual women's health  8.  Smoking/etoh/drug screening- no exposure.

## 2019-11-13 NOTE — PROGRESS NOTES
Pt here today for postpartum exam.  Delivery Date 09/27/2019  Currently: Breast and Bottle   BCM: declined, [facility] and [facility].   Good ph:  Pt states that after delivering baby that when she sits for a long time her tale bone feels sore and her ankles are sore as well   Last pap 01/08/2019 [facility]  [place] Scale- 2

## 2020-01-24 ENCOUNTER — OFFICE VISIT (OUTPATIENT)
Dept: MEDICAL GROUP | Facility: MEDICAL CENTER | Age: 25
End: 2020-01-24
Payer: COMMERCIAL

## 2020-01-24 VITALS
DIASTOLIC BLOOD PRESSURE: 64 MMHG | HEART RATE: 78 BPM | OXYGEN SATURATION: 95 % | SYSTOLIC BLOOD PRESSURE: 102 MMHG | HEIGHT: 60 IN | RESPIRATION RATE: 16 BRPM | WEIGHT: 149 LBS | BODY MASS INDEX: 29.25 KG/M2 | TEMPERATURE: 98 F

## 2020-01-24 DIAGNOSIS — N91.0 DELAYED MENSES: ICD-10-CM

## 2020-01-24 DIAGNOSIS — R42 DIZZINESS: ICD-10-CM

## 2020-01-24 DIAGNOSIS — R53.83 FATIGUE, UNSPECIFIED TYPE: ICD-10-CM

## 2020-01-24 DIAGNOSIS — D64.9 ANEMIA, UNSPECIFIED TYPE: ICD-10-CM

## 2020-01-24 PROCEDURE — 99214 OFFICE O/P EST MOD 30 MIN: CPT | Performed by: FAMILY MEDICINE

## 2020-01-24 ASSESSMENT — PATIENT HEALTH QUESTIONNAIRE - PHQ9: CLINICAL INTERPRETATION OF PHQ2 SCORE: 0

## 2020-01-25 NOTE — PROGRESS NOTES
Chief Complaint   Patient presents with   • Dizziness   • Fatigue   • Anemia       Subjective:     HPI:   Marian Dee presents today with the followin. Anemia, unspecified type  Patient was moderately anemic prepartum and more anemic of course postpartum.  She feels she has not fully recovered.  She is feeling quite tired.  She does have a history of anemia off-and-on starting in her teens.  Lab orders discussed and placed.    2. Delayed menses  Patient's youngest daughter was born in .  Her baby is now 4 months old.  She breast fed two months but then stopped.  She has not had a menstrual flow since the lochia postpartum.  Discussed that this is not unusual but appropriate lab orders discussed and placed.  They are using condoms for contraception, usually a good method but of course nothing is full proof.    3. Dizziness  Began 3 weeks ago.  Sometimes is very unsteady.  No vertigo, really lightheaded and unsteady.  Happens randomly, even when already walking.  Feels occasionally as if she will fall.  Orders discussed and placed.  She has stopped taking her prenatal vitamins once she stopped breast-feeding.  I have asked her to resume those.  However, lab orders are also discussed and placed.    4. Fatigue, unspecified type  Patient states she is feeling unusually fatigued.  This is much more than she would expect even with her 4-month-old.  She finds that if she sits down in a chair she finds her eyes closing involuntarily.  She sometimes has to fight to stay awake.  She often wakes up fatigued as well.  Explained that thyroid problems postpartum are relatively common.  Lab orders are discussed and placed.        Patient Active Problem List    Diagnosis Date Noted   •  (spontaneous vaginal delivery) 2019       Current medicines (including changes today)  Current Outpatient Medications   Medication Sig Dispense Refill   • prenatal plus vitamin (STUARTNATAL  1+1) 27-1 MG Tab tablet Take 1 Tab by mouth every morning. 60 Tab 1     No current facility-administered medications for this visit.        No Known Allergies    ROS: As per HPI.  Denies abdominal pain.  Denies blood in the stool or urine.  Denies trouble swallowing.  Denies vomiting or nausea.  Denies tremor.  Does feel unusually cold at times.       Objective:     /64 (BP Location: Right arm, Patient Position: Sitting)   Pulse 78   Temp 36.7 °C (98 °F) (Temporal)   Resp 16   Ht 1.524 m (5')   Wt 67.6 kg (149 lb)   SpO2 95%  Body mass index is 29.1 kg/m².    Physical Exam:  Constitutional: Well-developed and well-nourished. Not diaphoretic. No distress. Lucid and fluent.  Skin: Skin is warm and dry. No rash noted.  Head: Atraumatic without lesions.  Eyes: Conjunctivae and extraocular motions are normal. Pupils are equal, round, and reactive to light. No scleral icterus.   Neck: Supple, trachea midline. No thyromegaly present. No cervical or supraclavicular lymphadenopathy. No JVD appreciated  Cardiovascular: Regular rate and rhythm.  Normal S1, S2 without murmur appreciated.  Chest: Effort normal. Clear to auscultation throughout. No adventitious sounds.  Good air movement.   Abdomen: Soft, non tender, and without distention. Active bowel sounds in all four quadrants. No rebound, guarding, masses or hepatosplenomegaly.  Extremities: No cyanosis, clubbing, erythema, nor edema.   Neurological: Alert and oriented x 3. No tremor noted. Movements smooth and symmetric.  Psychiatric:  Behavior, mood, and affect are appropriate.       Assessment and Plan:     24 y.o. female with the following issues:    1. Anemia, unspecified type  CBC WITH DIFFERENTIAL    IRON/TOTAL IRON BIND    VITAMIN B12    FOLATE    Comp Metabolic Panel    TSH WITH REFLEX TO FT4   2. Delayed menses  HCG QUAL SERUM    Comp Metabolic Panel    TSH WITH REFLEX TO FT4   3. Dizziness  Comp Metabolic Panel    TSH WITH REFLEX TO FT4   4. Fatigue,  unspecified type  Comp Metabolic Panel    TSH WITH REFLEX TO FT4         Followup: Return in about 3 months (around 4/24/2020), or if symptoms worsen or fail to improve.

## 2020-01-31 ENCOUNTER — HOSPITAL ENCOUNTER (OUTPATIENT)
Dept: LAB | Facility: MEDICAL CENTER | Age: 25
End: 2020-01-31
Attending: FAMILY MEDICINE
Payer: COMMERCIAL

## 2020-01-31 DIAGNOSIS — N91.0 DELAYED MENSES: ICD-10-CM

## 2020-01-31 DIAGNOSIS — R53.83 FATIGUE, UNSPECIFIED TYPE: ICD-10-CM

## 2020-01-31 DIAGNOSIS — R42 DIZZINESS: ICD-10-CM

## 2020-01-31 DIAGNOSIS — D64.9 ANEMIA, UNSPECIFIED TYPE: ICD-10-CM

## 2020-01-31 LAB
ALBUMIN SERPL BCP-MCNC: 4.5 G/DL (ref 3.2–4.9)
ALBUMIN/GLOB SERPL: 1.5 G/DL
ALP SERPL-CCNC: 66 U/L (ref 30–99)
ALT SERPL-CCNC: 14 U/L (ref 2–50)
ANION GAP SERPL CALC-SCNC: 7 MMOL/L (ref 0–11.9)
AST SERPL-CCNC: 13 U/L (ref 12–45)
BASOPHILS # BLD AUTO: 0.7 % (ref 0–1.8)
BASOPHILS # BLD: 0.07 K/UL (ref 0–0.12)
BILIRUB SERPL-MCNC: 0.3 MG/DL (ref 0.1–1.5)
BUN SERPL-MCNC: 17 MG/DL (ref 8–22)
CALCIUM SERPL-MCNC: 10.4 MG/DL (ref 8.5–10.5)
CHLORIDE SERPL-SCNC: 106 MMOL/L (ref 96–112)
CO2 SERPL-SCNC: 27 MMOL/L (ref 20–33)
CREAT SERPL-MCNC: 0.74 MG/DL (ref 0.5–1.4)
EOSINOPHIL # BLD AUTO: 0.26 K/UL (ref 0–0.51)
EOSINOPHIL NFR BLD: 2.5 % (ref 0–6.9)
ERYTHROCYTE [DISTWIDTH] IN BLOOD BY AUTOMATED COUNT: 43.8 FL (ref 35.9–50)
FOLATE SERPL-MCNC: 13.6 NG/ML
GLOBULIN SER CALC-MCNC: 3.1 G/DL (ref 1.9–3.5)
GLUCOSE SERPL-MCNC: 86 MG/DL (ref 65–99)
HCG SERPL QL: NEGATIVE
HCT VFR BLD AUTO: 44.9 % (ref 37–47)
HGB BLD-MCNC: 15 G/DL (ref 12–16)
IMM GRANULOCYTES # BLD AUTO: 0.03 K/UL (ref 0–0.11)
IMM GRANULOCYTES NFR BLD AUTO: 0.3 % (ref 0–0.9)
IRON SATN MFR SERPL: 17 % (ref 15–55)
IRON SERPL-MCNC: 74 UG/DL (ref 40–170)
LYMPHOCYTES # BLD AUTO: 2.98 K/UL (ref 1–4.8)
LYMPHOCYTES NFR BLD: 28.1 % (ref 22–41)
MCH RBC QN AUTO: 30.9 PG (ref 27–33)
MCHC RBC AUTO-ENTMCNC: 33.4 G/DL (ref 33.6–35)
MCV RBC AUTO: 92.6 FL (ref 81.4–97.8)
MONOCYTES # BLD AUTO: 0.83 K/UL (ref 0–0.85)
MONOCYTES NFR BLD AUTO: 7.8 % (ref 0–13.4)
NEUTROPHILS # BLD AUTO: 6.43 K/UL (ref 2–7.15)
NEUTROPHILS NFR BLD: 60.6 % (ref 44–72)
NRBC # BLD AUTO: 0 K/UL
NRBC BLD-RTO: 0 /100 WBC
PLATELET # BLD AUTO: 270 K/UL (ref 164–446)
PMV BLD AUTO: 11 FL (ref 9–12.9)
POTASSIUM SERPL-SCNC: 3.8 MMOL/L (ref 3.6–5.5)
PROT SERPL-MCNC: 7.6 G/DL (ref 6–8.2)
RBC # BLD AUTO: 4.85 M/UL (ref 4.2–5.4)
SODIUM SERPL-SCNC: 140 MMOL/L (ref 135–145)
TIBC SERPL-MCNC: 441 UG/DL (ref 250–450)
TSH SERPL DL<=0.005 MIU/L-ACNC: 0.7 UIU/ML (ref 0.38–5.33)
VIT B12 SERPL-MCNC: 230 PG/ML (ref 211–911)
WBC # BLD AUTO: 10.6 K/UL (ref 4.8–10.8)

## 2020-01-31 PROCEDURE — 82746 ASSAY OF FOLIC ACID SERUM: CPT

## 2020-01-31 PROCEDURE — 83540 ASSAY OF IRON: CPT

## 2020-01-31 PROCEDURE — 83550 IRON BINDING TEST: CPT

## 2020-01-31 PROCEDURE — 84443 ASSAY THYROID STIM HORMONE: CPT

## 2020-01-31 PROCEDURE — 80053 COMPREHEN METABOLIC PANEL: CPT

## 2020-01-31 PROCEDURE — 36415 COLL VENOUS BLD VENIPUNCTURE: CPT

## 2020-01-31 PROCEDURE — 84703 CHORIONIC GONADOTROPIN ASSAY: CPT

## 2020-01-31 PROCEDURE — 82607 VITAMIN B-12: CPT

## 2020-01-31 PROCEDURE — 85025 COMPLETE CBC W/AUTO DIFF WBC: CPT

## 2020-03-03 ENCOUNTER — OFFICE VISIT (OUTPATIENT)
Dept: URGENT CARE | Facility: PHYSICIAN GROUP | Age: 25
End: 2020-03-03
Payer: COMMERCIAL

## 2020-03-03 VITALS
RESPIRATION RATE: 14 BRPM | TEMPERATURE: 97.9 F | WEIGHT: 148 LBS | SYSTOLIC BLOOD PRESSURE: 112 MMHG | DIASTOLIC BLOOD PRESSURE: 70 MMHG | HEIGHT: 60 IN | BODY MASS INDEX: 29.06 KG/M2 | HEART RATE: 87 BPM | OXYGEN SATURATION: 98 %

## 2020-03-03 DIAGNOSIS — J22 LOWER RESPIRATORY INFECTION: ICD-10-CM

## 2020-03-03 DIAGNOSIS — R05.9 COUGH: ICD-10-CM

## 2020-03-03 PROCEDURE — 99214 OFFICE O/P EST MOD 30 MIN: CPT | Performed by: NURSE PRACTITIONER

## 2020-03-03 RX ORDER — ALBUTEROL SULFATE 90 UG/1
1-2 AEROSOL, METERED RESPIRATORY (INHALATION) EVERY 4 HOURS PRN
Qty: 1 INHALER | Refills: 0 | Status: SHIPPED | OUTPATIENT
Start: 2020-03-03 | End: 2020-06-12

## 2020-03-03 RX ORDER — BENZONATATE 100 MG/1
100 CAPSULE ORAL 3 TIMES DAILY PRN
Qty: 30 CAP | Refills: 0 | Status: SHIPPED | OUTPATIENT
Start: 2020-03-03 | End: 2020-06-12

## 2020-03-03 RX ORDER — AMOXICILLIN AND CLAVULANATE POTASSIUM 875; 125 MG/1; MG/1
1 TABLET, FILM COATED ORAL 2 TIMES DAILY
Qty: 10 TAB | Refills: 0 | Status: SHIPPED | OUTPATIENT
Start: 2020-03-03 | End: 2020-03-08

## 2020-03-03 ASSESSMENT — FIBROSIS 4 INDEX: FIB4 SCORE: 0.32

## 2020-03-04 ASSESSMENT — ENCOUNTER SYMPTOMS
SORE THROAT: 0
FEVER: 0
HEADACHES: 0
SPUTUM PRODUCTION: 1
VOMITING: 0
PALPITATIONS: 0
EYE REDNESS: 0
ABDOMINAL PAIN: 0
CHILLS: 0
SHORTNESS OF BREATH: 1
NAUSEA: 0
WHEEZING: 1
STRIDOR: 0
COUGH: 1
EYE DISCHARGE: 0

## 2020-03-04 NOTE — PROGRESS NOTES
Subjective:   Marian Dee is a 24 y.o. female who presents for Cough (chest [id number] )        Cough   This is a new (States started off as cold, but cough has continued) problem. The current episode started 1 to 4 weeks ago. The problem has been unchanged. The cough is productive of sputum. Associated symptoms include nasal congestion, postnasal drip, shortness of breath and wheezing. Pertinent negatives include no chest pain, chills, ear pain, eye redness, fever, headaches, rash or sore throat. She has tried OTC cough suppressant for the symptoms. The treatment provided no relief. There is no history of asthma or pneumonia.        Review of Systems   Constitutional: Positive for malaise/fatigue. Negative for chills and fever.   HENT: Positive for congestion and postnasal drip. Negative for ear discharge, ear pain and sore throat.    Eyes: Negative for discharge and redness.   Respiratory: Positive for cough, sputum production, shortness of breath and wheezing. Negative for stridor.    Cardiovascular: Negative for chest pain and palpitations.   Gastrointestinal: Negative for abdominal pain, nausea and vomiting.   Skin: Negative for itching and rash.   Neurological: Negative for headaches.   All other systems reviewed and are negative.    PMH:  has a past medical history of History of hepatitis B.  ALLERGIES: No Known Allergies    Patient's PMH, SocHx, SurgHx, FamHx, Drug allergies and medications reviewed.     Objective:   /70   Pulse 87   Temp 36.6 °C (97.9 °F) (Temporal)   Resp 14   Ht 1.524 m (5')   Wt 67.1 kg (148 lb)   SpO2 98%   BMI 28.90 kg/m²   Physical Exam  Vitals signs reviewed.   Constitutional:       Appearance: She is well-developed.   HENT:      Head: Normocephalic.      Right Ear: Tympanic membrane and ear canal normal. No middle ear effusion. Tympanic membrane is not perforated or erythematous.      Left Ear: Tympanic membrane and ear canal normal.  No middle  ear effusion. Tympanic membrane is not perforated or erythematous.      Nose: Mucosal edema and congestion present. No rhinorrhea.      Left Turbinates: Swollen.      Right Sinus: No maxillary sinus tenderness or frontal sinus tenderness.      Left Sinus: No maxillary sinus tenderness or frontal sinus tenderness.      Mouth/Throat:      Lips: Pink.      Mouth: Mucous membranes are moist.      Pharynx: Uvula midline. Oropharyngeal exudate present. No posterior oropharyngeal erythema or uvula swelling.      Tonsils: No tonsillar exudate.   Eyes:      General: Lids are normal.      Extraocular Movements: Extraocular movements intact.      Conjunctiva/sclera: Conjunctivae normal.      Pupils: Pupils are equal, round, and reactive to light.   Neck:      Musculoskeletal: Normal range of motion.      Thyroid: No thyromegaly.   Cardiovascular:      Rate and Rhythm: Normal rate and regular rhythm.      Heart sounds: Normal heart sounds.   Pulmonary:      Effort: Pulmonary effort is normal. No respiratory distress.      Breath sounds: Examination of the left-upper field reveals wheezing. Wheezing present.   Lymphadenopathy:      Head:      Right side of head: No submandibular or tonsillar adenopathy.      Left side of head: No submandibular or tonsillar adenopathy.   Skin:     General: Skin is warm and dry.      Findings: No rash.   Neurological:      Mental Status: She is alert and oriented to person, place, and time.   Psychiatric:         Mood and Affect: Mood normal.         Speech: Speech normal.         Behavior: Behavior normal. Behavior is cooperative.         Thought Content: Thought content normal.         Judgment: Judgment normal.           Assessment/Plan:   Assessment    1. Lower respiratory infection  albuterol 108 (90 Base) MCG/ACT Aero Soln inhalation aerosol    amoxicillin-clavulanate (AUGMENTIN) 875-125 MG Tab   2. Cough  benzonatate (TESSALON) 100 MG Cap     Patient encouraged to increase clear liquid  intake    Differential diagnosis, natural history, supportive care, and indications for immediate follow-up discussed.     **Please note that all invasive procedures during this visit were performed by myself and/or the Medical Assistant under the supervision of the PA or MD in office**

## 2020-06-12 ENCOUNTER — HOSPITAL ENCOUNTER (OUTPATIENT)
Facility: MEDICAL CENTER | Age: 25
End: 2020-06-12
Attending: FAMILY MEDICINE
Payer: COMMERCIAL

## 2020-06-12 ENCOUNTER — OFFICE VISIT (OUTPATIENT)
Dept: URGENT CARE | Facility: CLINIC | Age: 25
End: 2020-06-12
Payer: COMMERCIAL

## 2020-06-12 VITALS
BODY MASS INDEX: 29.06 KG/M2 | HEART RATE: 83 BPM | RESPIRATION RATE: 16 BRPM | TEMPERATURE: 97.8 F | WEIGHT: 148 LBS | HEIGHT: 60 IN | SYSTOLIC BLOOD PRESSURE: 90 MMHG | OXYGEN SATURATION: 97 % | DIASTOLIC BLOOD PRESSURE: 64 MMHG

## 2020-06-12 DIAGNOSIS — Z20.822 SUSPECTED COVID-19 VIRUS INFECTION: ICD-10-CM

## 2020-06-12 DIAGNOSIS — R53.83 FATIGUE, UNSPECIFIED TYPE: ICD-10-CM

## 2020-06-12 DIAGNOSIS — R19.7 DIARRHEA, UNSPECIFIED TYPE: ICD-10-CM

## 2020-06-12 LAB
COVID ORDER STATUS COVID19: NORMAL
INT CON NEG: NEGATIVE
INT CON POS: POSITIVE
S PYO AG THROAT QL: NEGATIVE

## 2020-06-12 PROCEDURE — 87880 STREP A ASSAY W/OPTIC: CPT | Performed by: FAMILY MEDICINE

## 2020-06-12 PROCEDURE — 99214 OFFICE O/P EST MOD 30 MIN: CPT | Performed by: FAMILY MEDICINE

## 2020-06-12 RX ORDER — DICYCLOMINE HCL 20 MG
TABLET ORAL
Qty: 15 TAB | Refills: 0 | Status: SHIPPED | OUTPATIENT
Start: 2020-06-12 | End: 2022-12-23

## 2020-06-12 ASSESSMENT — FIBROSIS 4 INDEX: FIB4 SCORE: 0.32

## 2020-06-13 NOTE — PROGRESS NOTES
Chief Complaint:    Chief Complaint   Patient presents with   • Cough     fatigue, diarrhea       History of Present Illness:     present. This is a new problem. Patient has diarrhea that started yesterday, rates it moderate severity, and not getting better. Used Pepto Bismol for symptoms without help. She also has fatigue. Dry cough started today. She has not had fever, nasal symptoms, sore throat, or vomiting.  is also being seen today with symptoms of fatigue, headache, post-nasal drainage, sneezing, sore throat in AM, and cough productive of mildly purulent mucus.  just found out today that a co-worker he worked with this past weekend has since tested positive for COVID-19 since he last worked with that person. Patient is requesting COVID-19 testing as noted in "[employer] message sent earlier today.      Review of Systems:    Constitutional: See HPI. Negative for fever, chills, and diaphoresis.   Eyes: Negative for change in vision, photophobia, pain, redness, and discharge.  ENT: Negative for ear pain, ear discharge, hearing loss, tinnitus, nasal congestion, nosebleeds, and sore throat.    Respiratory: See HPI.   Cardiovascular: Negative for chest pain, palpitations, orthopnea, claudication, leg swelling, and PND.   Gastrointestinal: See HPI.   Genitourinary: Negative for dysuria, urinary urgency, urinary frequency, hematuria, and flank pain.   Musculoskeletal: Negative for myalgias, joint pain, neck pain, and back pain.   Skin: Negative for rash and itching.   Neurological: Negative for dizziness, tingling, tremors, sensory change, speech change, focal weakness, seizures, loss of consciousness, and headaches.   Endo: Negative for polydipsia.   Heme: Does not bruise/bleed easily.   Psychiatric/Behavioral: Negative for depression, suicidal ideas, hallucinations, memory loss and substance abuse. The patient is not nervous/anxious and does not have insomnia.        Past Medical History:    Past  Medical History:   Diagnosis Date   • History of hepatitis B     in childhood     Past Surgical History:    History reviewed. No pertinent surgical history.    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   Social Needs   • Financial resource strain: Not on file   • Food insecurity     Worry: Not on file     Inability: Not on file   • Transportation needs     Medical: Not on file     Non-medical: Not on file   Tobacco Use   • Smoking status: Never Smoker   • Smokeless tobacco: Never Used   Substance and Sexual Activity   • Alcohol use: No   • Drug use: No   • Sexual activity: Yes     Partners: Male     Comment: no birth control   Lifestyle   • Physical activity     Days per week: Not on file     Minutes per session: Not on file   • Stress: Not on file   Relationships   • Social connections     Talks on phone: Not on file     Gets together: Not on file     Attends Baptism service: Not on file     Active member of club or organization: Not on file     Attends meetings of clubs or organizations: Not on file     Relationship status: Not on file   • Intimate partner violence     Fear of current or ex partner: Not on file     Emotionally abused: Not on file     Physically abused: Not on file     Forced sexual activity: Not on file   Other Topics Concern   • Not on file   Social History Narrative   • Not on file     Family History:    Family History   Problem Relation Age of Onset   • No Known Problems Mother    • No Known Problems Father    • Hyperlipidemia Maternal Grandmother    • Hyperlipidemia Paternal Grandmother    • Diabetes Paternal Grandmother    • Hyperlipidemia Paternal Grandfather    • Heart Disease Neg Hx    • Stroke Neg Hx       Medications:    No current outpatient medications on file prior to visit.     No current facility-administered medications on file prior to visit.      Allergies:    No Known Allergies      Vitals:    Vitals:    06/12/20 1856   BP: (!) 90/64   Patient Position: Sitting   Pulse: 83   Resp: 16   Temp: 36.6 °C (97.8 °F)   TempSrc: Temporal   SpO2: 97%   Weight: 67.1 kg (148 lb)   Height: 1.524 m (5')       Physical Exam:    Constitutional: Vital signs reviewed. Appears well-developed and well-nourished. No acute distress.   Eyes: Sclera white, conjunctivae clear.   ENT: External ears normal. Hearing normal. Nasal mucosa pink. Lips/teeth are normal. Oral mucosa pink and moist. Posterior pharynx: WNL.  Neck: Neck supple.   Cardiovascular: Regular rate and rhythm. No murmur.  Pulmonary/Chest: Respirations non-labored. Clear to auscultation bilaterally.  Abdomen: Bowel sounds are normal active. Soft, non-distended, and non-tender to palpation.  Lymph: Cervical nodes without tenderness or enlargement.  Musculoskeletal: Normal gait. Normal range of motion. No muscular atrophy or weakness.  Neurological: Alert and oriented to person, place, and time. Muscle tone normal. Coordination normal.   Skin: No rashes or lesions. Warm, dry, normal turgor.  Psychiatric: Normal mood and affect. Behavior is normal. Judgment and thought content normal.       Diagnostics:    POCT Rapid Strep A   Order: [id number]   Status:  Final result   Visible to patient:  No (not released) Next appt:  None Dx:  Fatigue, unspecified type; Diarrhea, ...   Component  7:30 PM   Rapid Strep Screen  negative     Internal Control Positive  Positive     Internal Control Negative  Negative           Specimen Collected: 06/12/20  7:30 PM  Last Resulted: 06/12/20  7:31 PM             Assessment / Plan:    1. Suspected COVID-19 virus infection  - COVID/SARS COV-2 PCR; Future    2. Fatigue, unspecified type  - POCT Rapid Strep A    3. Diarrhea, unspecified  type  - POCT Rapid Strep A  - dicyclomine (BENTYL) 20 MG Tab; 1 TAB BY MOUTH EVERY 6 HOURS ONLY IF NEEDED FOR DIARRHEA AND/OR ABDOMINAL CRAMPS.  Dispense: 15 Tab; Refill: 0      Discussed with them DDX, management options, and risks, benefits, and alternatives to treatment plan agreed upon.    Rapid Strep done since  is also being seen today with intermittent sore throat.    She is clinically stable and prefers to use OTC Pepto (warned of dark stools) and/or Imodium prn diarrhea.    Back-up Rx for Dicyclomine she will fill and use if needed for diarrhea.    They desire COVID-19 testing, swab was obtained, advised result usually in 2-3 days, and she will be contacted with result.    She will follow-up if needed while waiting test result.

## 2020-06-16 ENCOUNTER — TELEPHONE (OUTPATIENT)
Dept: HEALTH INFORMATION MANAGEMENT | Facility: OTHER | Age: 25
End: 2020-06-16

## 2020-06-16 ENCOUNTER — TELEPHONE (OUTPATIENT)
Dept: URGENT CARE | Facility: CLINIC | Age: 25
End: 2020-06-16

## 2020-06-16 LAB
SARS-COV-2 RNA RESP QL NAA+PROBE: NOT DETECTED
SPECIMEN SOURCE: NORMAL

## 2020-06-16 NOTE — TELEPHONE ENCOUNTER
1. Caller Name: Marian Dunaway             Call Back Number: [phone]  [facility] PCP or Specialty Provider: Yes Jose Carrion        2.  In the last two weeks, has the patient had any new or worsening symptoms (not explained by alternative diagnosis)? No.    3.  Does patient have any comoribidities? None     4.  Has the patient traveled in the last 14 days OR had any known contact with someone who is suspected or confirmed to have COVID-19?  Yes, Patient Covid negative but  Covid 19 positive 6/16/2020.  had a contact with coworker who was Covid positive. On self isolation.    5. POTENTIAL PUI (LOW): Advised to perform self care, monitor for worsening symptoms and to call back in 3 days if no improvement. Instructed to self isolate and contact [facility] at [phone]         Note routed to [facility] Provider: [facility] only.

## 2020-07-22 DIAGNOSIS — H10.13 ALLERGIC CONJUNCTIVITIS OF BOTH EYES: ICD-10-CM

## 2020-07-22 RX ORDER — KETOROLAC TROMETHAMINE 5 MG/ML
1 SOLUTION OPHTHALMIC 4 TIMES DAILY
Qty: 5 ML | Refills: 0 | Status: SHIPPED | OUTPATIENT
Start: 2020-07-22 | End: 2022-12-23

## 2020-07-25 DIAGNOSIS — K12.0 APHTHOUS STOMATITIS: ICD-10-CM

## 2020-07-25 RX ORDER — LIDOCAINE HYDROCHLORIDE 20 MG/ML
5 SOLUTION OROPHARYNGEAL 4 TIMES DAILY PRN
Qty: 30 ML | Refills: 0 | Status: SHIPPED | OUTPATIENT
Start: 2020-07-25 | End: 2024-03-18

## 2021-06-21 ENCOUNTER — TELEPHONE (OUTPATIENT)
Dept: MEDICAL GROUP | Facility: MEDICAL CENTER | Age: 26
End: 2021-06-21

## 2021-06-21 NOTE — TELEPHONE ENCOUNTER
Phone Number Called: [phone]    Call outcome: Spoke to patient regarding message below.    Message: Called to speak with patient regarding her patient advice message about her . Patient triaged on his chart.

## 2021-07-14 DIAGNOSIS — L74.0 HEAT RASH: ICD-10-CM

## 2021-07-14 RX ORDER — TRIAMCINOLONE ACETONIDE 1 MG/G
CREAM TOPICAL
Qty: 45 G | Refills: 0 | Status: SHIPPED | OUTPATIENT
Start: 2021-07-14 | End: 2022-12-23

## 2021-09-15 DIAGNOSIS — R06.02 SHORTNESS OF BREATH: ICD-10-CM

## 2021-09-15 DIAGNOSIS — J22 LOWER RESPIRATORY INFECTION: ICD-10-CM

## 2021-09-15 RX ORDER — ALBUTEROL SULFATE 90 UG/1
1-2 AEROSOL, METERED RESPIRATORY (INHALATION) EVERY 4 HOURS PRN
Qty: 8 G | Refills: 3 | Status: SHIPPED | OUTPATIENT
Start: 2021-09-15 | End: 2023-11-06 | Stop reason: SDUPTHER

## 2021-09-21 ENCOUNTER — HOSPITAL ENCOUNTER (EMERGENCY)
Dept: HOSPITAL 8 - ED | Age: 26
Discharge: HOME | End: 2021-09-21
Payer: COMMERCIAL

## 2021-09-21 VITALS — WEIGHT: 147.05 LBS | BODY MASS INDEX: 28.87 KG/M2 | HEIGHT: 60 IN

## 2021-09-21 VITALS — DIASTOLIC BLOOD PRESSURE: 71 MMHG | SYSTOLIC BLOOD PRESSURE: 112 MMHG

## 2021-09-21 DIAGNOSIS — R94.31: ICD-10-CM

## 2021-09-21 DIAGNOSIS — J45.901: Primary | ICD-10-CM

## 2021-09-21 DIAGNOSIS — Z20.822: ICD-10-CM

## 2021-09-21 DIAGNOSIS — R06.00: ICD-10-CM

## 2021-09-21 DIAGNOSIS — R05: ICD-10-CM

## 2021-09-21 DIAGNOSIS — J45.909: ICD-10-CM

## 2021-09-21 DIAGNOSIS — R07.89: ICD-10-CM

## 2021-09-21 PROCEDURE — U0003 INFECTIOUS AGENT DETECTION BY NUCLEIC ACID (DNA OR RNA); SEVERE ACUTE RESPIRATORY SYNDROME CORONAVIRUS 2 (SARS-COV-2) (CORONAVIRUS DISEASE [COVID-19]), AMPLIFIED PROBE TECHNIQUE, MAKING USE OF HIGH THROUGHPUT TECHNOLOGIES AS DESCRIBED BY CMS-2020-01-R: HCPCS

## 2021-09-21 PROCEDURE — 71045 X-RAY EXAM CHEST 1 VIEW: CPT

## 2021-09-21 PROCEDURE — 99285 EMERGENCY DEPT VISIT HI MDM: CPT

## 2021-09-21 PROCEDURE — 93005 ELECTROCARDIOGRAM TRACING: CPT

## 2021-09-21 NOTE — NUR
pt walked back to this room at this time. Laura MCMANUS at bs for eval and poc. pt 
reports coming into ed for dry coughing since tuesday. pt covid swabbed in 
triage. lungs clear on auscultation. nad, placed on spo2/bp monitoring at this 
time. tiffanie.

## 2022-12-23 ENCOUNTER — OFFICE VISIT (OUTPATIENT)
Dept: MEDICAL GROUP | Facility: MEDICAL CENTER | Age: 27
End: 2022-12-23
Payer: COMMERCIAL

## 2022-12-23 VITALS
RESPIRATION RATE: 16 BRPM | WEIGHT: 151.01 LBS | BODY MASS INDEX: 29.65 KG/M2 | TEMPERATURE: 98 F | HEIGHT: 60 IN | DIASTOLIC BLOOD PRESSURE: 74 MMHG | OXYGEN SATURATION: 95 % | HEART RATE: 92 BPM | SYSTOLIC BLOOD PRESSURE: 112 MMHG

## 2022-12-23 DIAGNOSIS — Z00.00 ROUTINE GENERAL MEDICAL EXAMINATION AT A HEALTH CARE FACILITY: ICD-10-CM

## 2022-12-23 DIAGNOSIS — Z00.00 LABORATORY EXAMINATION ORDERED AS PART OF A ROUTINE GENERAL MEDICAL EXAMINATION: ICD-10-CM

## 2022-12-23 DIAGNOSIS — Z11.59 NEED FOR HEPATITIS C SCREENING TEST: ICD-10-CM

## 2022-12-23 PROCEDURE — 99395 PREV VISIT EST AGE 18-39: CPT | Performed by: FAMILY MEDICINE

## 2022-12-23 ASSESSMENT — ENCOUNTER SYMPTOMS
CHILLS: 0
SPEECH CHANGE: 0
VOMITING: 0
WHEEZING: 0
WEAKNESS: 0
BACK PAIN: 0
DIZZINESS: 0
TINGLING: 0
MYALGIAS: 0
LOSS OF CONSCIOUSNESS: 0
BLOOD IN STOOL: 0
MEMORY LOSS: 0
SPUTUM PRODUCTION: 0
BRUISES/BLEEDS EASILY: 0
INSOMNIA: 0
SHORTNESS OF BREATH: 0
TREMORS: 0
WEIGHT LOSS: 0
BLURRED VISION: 0
ORTHOPNEA: 0
HEADACHES: 0
DIAPHORESIS: 0
DOUBLE VISION: 0
DEPRESSION: 0
FOCAL WEAKNESS: 0
NERVOUS/ANXIOUS: 0
HALLUCINATIONS: 0
SORE THROAT: 0
SEIZURES: 0
ROS GI COMMENTS: DENIES DYSPHAGIA
HEARTBURN: 0
ABDOMINAL PAIN: 0
COUGH: 0
NECK PAIN: 0
DIARRHEA: 0
FEVER: 0
NAUSEA: 0
SENSORY CHANGE: 0
PALPITATIONS: 0

## 2022-12-23 ASSESSMENT — PATIENT HEALTH QUESTIONNAIRE - PHQ9: CLINICAL INTERPRETATION OF PHQ2 SCORE: 0

## 2022-12-23 ASSESSMENT — LIFESTYLE VARIABLES: SUBSTANCE_ABUSE: 0

## 2022-12-23 NOTE — PROGRESS NOTES
Subjective     Marian Dee is a 26 y.o. female who presents with Annual Exam        She is here for her annual examination.      HPI     has a past medical history of History of hepatitis B.   has no past surgical history on file.  family history includes Diabetes in her paternal grandmother; Hyperlipidemia in her maternal grandmother, paternal grandfather, and paternal grandmother; No Known Problems in her father and mother.   reports that she has never smoked. She has never used smokeless tobacco. She reports that she does not drink alcohol and does not use drugs.      Current Outpatient Medications:     albuterol 108 (90 Base) MCG/ACT Aero Soln inhalation aerosol, Inhale 1-2 Puffs every four hours as needed for Shortness of Breath., Disp: 8 g, Rfl: 3    lidocaine (XYLOCAINE) 2 % Solution, Take 5 mL by mouth 4 times a day as needed for Throat/Mouth Pain., Disp: 30 mL, Rfl: 0  has No Known Allergies.    Review of Systems   Constitutional:  Negative for chills, diaphoresis, fever, malaise/fatigue and weight loss.   HENT:  Negative for congestion, hearing loss, sore throat and tinnitus.    Eyes:  Negative for blurred vision and double vision.   Respiratory:  Negative for cough, sputum production, shortness of breath and wheezing.    Cardiovascular:  Positive for chest pain. Negative for palpitations, orthopnea and leg swelling.        Rare sharp chest pain, stabs and lasts around 30 seconds.  This is most notable sitting, movement helps   Gastrointestinal:  Negative for abdominal pain, blood in stool, diarrhea, heartburn, nausea and vomiting.        Denies dysphagia   Genitourinary:  Negative for dysuria, frequency, hematuria and urgency.   Musculoskeletal:  Negative for back pain, joint pain, myalgias and neck pain.   Skin:  Negative for rash.   Neurological:  Negative for dizziness, tingling, tremors, sensory change, speech change, focal weakness, seizures, loss of consciousness, weakness and  headaches.   Endo/Heme/Allergies:  Negative for environmental allergies. Does not bruise/bleed easily.   Psychiatric/Behavioral:  Negative for depression, hallucinations, memory loss, substance abuse and suicidal ideas. The patient is not nervous/anxious and does not have insomnia.             Objective     /74 (BP Location: Right arm, Patient Position: Sitting, BP Cuff Size: Small adult)   Pulse 92   Temp 36.7 °C (98 °F) (Temporal)   Resp 16   Ht 1.524 m (5')   Wt 68.5 kg (151 lb 0.2 oz)   SpO2 95%   BMI 29.49 kg/m²      Physical Exam  Vitals reviewed.   Constitutional:       Appearance: She is well-developed.   HENT:      Head: Normocephalic and atraumatic.   Eyes:      General:         Left eye: No discharge.      Pupils: Pupils are equal, round, and reactive to light.   Neck:      Thyroid: No thyromegaly.      Vascular: No JVD.   Cardiovascular:      Rate and Rhythm: Normal rate and regular rhythm.      Heart sounds: Normal heart sounds. No murmur heard.  Pulmonary:      Effort: Pulmonary effort is normal. No respiratory distress.      Breath sounds: Normal breath sounds. No wheezing or rales.   Abdominal:      General: Bowel sounds are normal. There is no distension.      Palpations: Abdomen is soft. There is no mass.      Tenderness: There is no abdominal tenderness.   Musculoskeletal:         General: Normal range of motion.      Cervical back: Normal range of motion and neck supple.   Lymphadenopathy:      Cervical: No cervical adenopathy.   Skin:     General: Skin is warm and dry.      Findings: No erythema or rash.   Neurological:      Mental Status: She is alert and oriented to person, place, and time.      Coordination: Coordination normal.   Psychiatric:         Mood and Affect: Mood normal.         Behavior: Behavior normal.            Assessment & Plan        1. Routine general medical examination at a health care facility  She is generally well and medically stable    2. Laboratory  examination ordered as part of a routine general medical examination  Routine orders discussed and placed  - Comp Metabolic Panel; Future  - Lipid Profile; Future  - TSH; Future  - CBC WITHOUT DIFFERENTIAL; Future    3. Need for hepatitis C screening test  Order discussed and placed  - HEP C VIRUS ANTIBODY; Future        Recheck one year, sooner as needed

## 2022-12-27 ENCOUNTER — OFFICE VISIT (OUTPATIENT)
Dept: URGENT CARE | Facility: CLINIC | Age: 27
End: 2022-12-27
Payer: COMMERCIAL

## 2022-12-27 VITALS
SYSTOLIC BLOOD PRESSURE: 110 MMHG | BODY MASS INDEX: 28.47 KG/M2 | RESPIRATION RATE: 14 BRPM | HEART RATE: 95 BPM | HEIGHT: 60 IN | TEMPERATURE: 97.4 F | WEIGHT: 145 LBS | DIASTOLIC BLOOD PRESSURE: 76 MMHG | OXYGEN SATURATION: 96 %

## 2022-12-27 DIAGNOSIS — J02.9 SORE THROAT: ICD-10-CM

## 2022-12-27 DIAGNOSIS — J02.0 ACUTE STREPTOCOCCAL PHARYNGITIS: ICD-10-CM

## 2022-12-27 LAB
INT CON NEG: NORMAL
INT CON POS: NORMAL
S PYO AG THROAT QL: POSITIVE

## 2022-12-27 PROCEDURE — 99213 OFFICE O/P EST LOW 20 MIN: CPT | Performed by: FAMILY MEDICINE

## 2022-12-27 PROCEDURE — 87880 STREP A ASSAY W/OPTIC: CPT | Performed by: FAMILY MEDICINE

## 2022-12-27 RX ORDER — AMOXICILLIN 500 MG/1
500 CAPSULE ORAL 2 TIMES DAILY
Qty: 20 CAPSULE | Refills: 0 | Status: SHIPPED | OUTPATIENT
Start: 2022-12-27 | End: 2022-12-29

## 2022-12-27 NOTE — LETTER
December 27, 2022    To Whom It May Concern:         This is confirmation that Marian Dee attended her scheduled appointment with Kyra Sutton M.D. on 12/27/22.  He may return to work tomorrow without any restrictions.         If you have any questions please do not hesitate to call me at the phone number listed below.    Sincerely,          Kyra Sutton M.D.  [phone]

## 2022-12-27 NOTE — PROGRESS NOTES
Subjective:      26 y.o. female presents to urgent care for cold symptoms that started yesterday.  She is experiencing headache, body ache, and sore throat.  No fever, cough, or diarrhea. She denies any tobacco product use.  No history of asthma or COPD.  She is not vaccinated against COVID.  No known sick contacts.    She denies any other questions or concerns at this time.    Current problem list, medication, and past medical/surgical history were reviewed in Epic.    ROS  See HPI     Objective:      /76   Pulse 95   Temp 36.3 °C (97.4 °F) (Temporal)   Resp 14   Ht 1.524 m (5')   Wt 65.8 kg (145 lb)   SpO2 96%   BMI 28.32 kg/m²     Physical Exam  Constitutional:       General: She is not in acute distress.     Appearance: She is not diaphoretic.   HENT:      Right Ear: Tympanic membrane, ear canal and external ear normal.      Left Ear: Tympanic membrane, ear canal and external ear normal.      Mouth/Throat:      Tongue: Tongue does not deviate from midline.      Palate: No lesions.      Pharynx: Uvula midline. Posterior oropharyngeal erythema present.      Tonsils: No tonsillar exudate. 1+ on the right. 1+ on the left.   Cardiovascular:      Rate and Rhythm: Normal rate and regular rhythm.      Heart sounds: Normal heart sounds.   Pulmonary:      Effort: Pulmonary effort is normal. No respiratory distress.      Breath sounds: Normal breath sounds.   Neurological:      Mental Status: She is alert.   Psychiatric:         Mood and Affect: Affect normal.         Judgment: Judgment normal.     Assessment/Plan:     1. Acute streptococcal pharyngitis  2. Sore throat  Rapid strep positive.  Prescription for amoxicillin has been sent.  Tylenol, ibuprofen, gargle warm salt water as needed for symptomatic relief.  - POCT Rapid Strep A  - amoxicillin (AMOXIL) 500 MG Cap; Take 1 Capsule by mouth 2 times a day for 10 days.  Dispense: 20 Capsule; Refill: 0      Instructed to return to Urgent Care or nearest  Emergency Department if symptoms fail to improve, for any change in condition, further concerns, or new concerning symptoms. Patient states understanding of the plan of care and discharge instructions.    Kyra Sutton M.D.

## 2023-03-01 DIAGNOSIS — J02.0 ACUTE STREPTOCOCCAL PHARYNGITIS: ICD-10-CM

## 2023-03-01 DIAGNOSIS — J02.9 SORE THROAT: ICD-10-CM

## 2023-03-01 RX ORDER — AMOXICILLIN 500 MG/1
500 CAPSULE ORAL 2 TIMES DAILY
Qty: 20 CAPSULE | Refills: 0 | Status: SHIPPED | OUTPATIENT
Start: 2023-03-01 | End: 2023-03-11

## 2023-05-25 ENCOUNTER — HOSPITAL ENCOUNTER (OUTPATIENT)
Dept: LAB | Facility: MEDICAL CENTER | Age: 28
End: 2023-05-25
Attending: FAMILY MEDICINE
Payer: COMMERCIAL

## 2023-05-25 DIAGNOSIS — Z00.00 LABORATORY EXAMINATION ORDERED AS PART OF A ROUTINE GENERAL MEDICAL EXAMINATION: ICD-10-CM

## 2023-05-25 DIAGNOSIS — Z11.59 NEED FOR HEPATITIS C SCREENING TEST: ICD-10-CM

## 2023-05-25 LAB
ALBUMIN SERPL BCP-MCNC: 4.4 G/DL (ref 3.2–4.9)
ALBUMIN/GLOB SERPL: 1.4 G/DL
ALP SERPL-CCNC: 73 U/L (ref 30–99)
ALT SERPL-CCNC: 17 U/L (ref 2–50)
ANION GAP SERPL CALC-SCNC: 9 MMOL/L (ref 7–16)
AST SERPL-CCNC: 21 U/L (ref 12–45)
BILIRUB SERPL-MCNC: 0.4 MG/DL (ref 0.1–1.5)
BUN SERPL-MCNC: 8 MG/DL (ref 8–22)
CALCIUM ALBUM COR SERPL-MCNC: 9.5 MG/DL (ref 8.5–10.5)
CALCIUM SERPL-MCNC: 9.8 MG/DL (ref 8.5–10.5)
CHLORIDE SERPL-SCNC: 104 MMOL/L (ref 96–112)
CHOLEST SERPL-MCNC: 176 MG/DL (ref 100–199)
CO2 SERPL-SCNC: 26 MMOL/L (ref 20–33)
CREAT SERPL-MCNC: 0.57 MG/DL (ref 0.5–1.4)
ERYTHROCYTE [DISTWIDTH] IN BLOOD BY AUTOMATED COUNT: 40.4 FL (ref 35.9–50)
GFR SERPLBLD CREATININE-BSD FMLA CKD-EPI: 127 ML/MIN/1.73 M 2
GLOBULIN SER CALC-MCNC: 3.2 G/DL (ref 1.9–3.5)
GLUCOSE SERPL-MCNC: 94 MG/DL (ref 65–99)
HCT VFR BLD AUTO: 45.1 % (ref 37–47)
HCV AB SER QL: NORMAL
HDLC SERPL-MCNC: 35 MG/DL
HGB BLD-MCNC: 14.8 G/DL (ref 12–16)
LDLC SERPL CALC-MCNC: 121 MG/DL
MCH RBC QN AUTO: 30 PG (ref 27–33)
MCHC RBC AUTO-ENTMCNC: 32.8 G/DL (ref 32.2–35.5)
MCV RBC AUTO: 91.5 FL (ref 81.4–97.8)
PLATELET # BLD AUTO: 291 K/UL (ref 164–446)
PMV BLD AUTO: 10.6 FL (ref 9–12.9)
POTASSIUM SERPL-SCNC: 4.4 MMOL/L (ref 3.6–5.5)
PROT SERPL-MCNC: 7.6 G/DL (ref 6–8.2)
RBC # BLD AUTO: 4.93 M/UL (ref 4.2–5.4)
SODIUM SERPL-SCNC: 139 MMOL/L (ref 135–145)
TRIGL SERPL-MCNC: 98 MG/DL (ref 0–149)
TSH SERPL DL<=0.005 MIU/L-ACNC: 1.1 UIU/ML (ref 0.38–5.33)
WBC # BLD AUTO: 9.3 K/UL (ref 4.8–10.8)

## 2023-05-25 PROCEDURE — 85027 COMPLETE CBC AUTOMATED: CPT

## 2023-05-25 PROCEDURE — 86803 HEPATITIS C AB TEST: CPT

## 2023-05-25 PROCEDURE — 84443 ASSAY THYROID STIM HORMONE: CPT

## 2023-05-25 PROCEDURE — 80053 COMPREHEN METABOLIC PANEL: CPT

## 2023-05-25 PROCEDURE — 36415 COLL VENOUS BLD VENIPUNCTURE: CPT

## 2023-05-25 PROCEDURE — 80061 LIPID PANEL: CPT

## 2023-10-09 DIAGNOSIS — M79.675 TOE PAIN, BILATERAL: ICD-10-CM

## 2023-10-09 DIAGNOSIS — M79.674 TOE PAIN, BILATERAL: ICD-10-CM

## 2023-11-06 DIAGNOSIS — J22 LOWER RESPIRATORY INFECTION: ICD-10-CM

## 2023-11-06 DIAGNOSIS — R06.02 SHORTNESS OF BREATH: ICD-10-CM

## 2023-11-06 RX ORDER — ALBUTEROL SULFATE 90 UG/1
1-2 AEROSOL, METERED RESPIRATORY (INHALATION) EVERY 4 HOURS PRN
Qty: 8 G | Refills: 3 | Status: SHIPPED | OUTPATIENT
Start: 2023-11-06

## 2023-11-12 ENCOUNTER — HOSPITAL ENCOUNTER (EMERGENCY)
Facility: MEDICAL CENTER | Age: 28
End: 2023-11-12
Attending: EMERGENCY MEDICINE
Payer: COMMERCIAL

## 2023-11-12 VITALS
RESPIRATION RATE: 18 BRPM | BODY MASS INDEX: 30.25 KG/M2 | OXYGEN SATURATION: 98 % | DIASTOLIC BLOOD PRESSURE: 65 MMHG | SYSTOLIC BLOOD PRESSURE: 121 MMHG | TEMPERATURE: 98.2 F | HEIGHT: 60 IN | HEART RATE: 82 BPM | WEIGHT: 154.1 LBS

## 2023-11-12 DIAGNOSIS — M54.2 ACUTE NECK PAIN: ICD-10-CM

## 2023-11-12 PROCEDURE — 99283 EMERGENCY DEPT VISIT LOW MDM: CPT

## 2023-11-12 PROCEDURE — 700101 HCHG RX REV CODE 250: Performed by: EMERGENCY MEDICINE

## 2023-11-12 PROCEDURE — A9270 NON-COVERED ITEM OR SERVICE: HCPCS | Performed by: EMERGENCY MEDICINE

## 2023-11-12 PROCEDURE — 700102 HCHG RX REV CODE 250 W/ 637 OVERRIDE(OP): Performed by: EMERGENCY MEDICINE

## 2023-11-12 RX ORDER — IBUPROFEN 600 MG/1
600 TABLET ORAL ONCE
Status: COMPLETED | OUTPATIENT
Start: 2023-11-12 | End: 2023-11-12

## 2023-11-12 RX ORDER — LIDOCAINE 50 MG/G
1 PATCH TOPICAL EVERY 24 HOURS
Status: DISCONTINUED | OUTPATIENT
Start: 2023-11-12 | End: 2023-11-12 | Stop reason: HOSPADM

## 2023-11-12 RX ORDER — CYCLOBENZAPRINE HCL 10 MG
10 TABLET ORAL 3 TIMES DAILY PRN
Qty: 30 TABLET | Refills: 0 | Status: SHIPPED | OUTPATIENT
Start: 2023-11-12 | End: 2024-03-18

## 2023-11-12 RX ADMIN — IBUPROFEN 600 MG: 600 TABLET, FILM COATED ORAL at 08:07

## 2023-11-12 RX ADMIN — LIDOCAINE 1 PATCH: 50 PATCH TOPICAL at 08:07

## 2023-11-12 ASSESSMENT — FIBROSIS 4 INDEX: FIB4 SCORE: 0.49

## 2023-11-12 ASSESSMENT — PAIN DESCRIPTION - DESCRIPTORS: DESCRIPTORS: ACHING

## 2023-11-12 ASSESSMENT — PAIN DESCRIPTION - PAIN TYPE: TYPE: ACUTE PAIN

## 2023-11-12 NOTE — ED NOTES
Patient bedside report given to JAN Antony . Pt AAO X 4 , respirations even and unlabored, on room air, call light in reach.

## 2023-11-12 NOTE — ED NOTES
Bedside report received from off going RN/tech: Joelle Rn, Myron RN assumed care of patient.  POC discussed with patient. Call light within reach, all needs addressed at this time.       Fall risk interventions in place: Patient's personal possessions are with in their safe reach and Keep floor surfaces clean and dry (all applicable per [place] Fall risk assessment)   Continuous monitoring: Pulse Ox or Blood Pressure  IVF/IV medications: Not Applicable   Oxygen: Room Air  Bedside sitter: Not Applicable   Isolation: Not Applicable

## 2023-11-12 NOTE — DISCHARGE INSTRUCTIONS
Apply a lidocaine patch which may be purchased without a prescription as Salonpas to the area as needed    Take ibuprofen 600 milligrams every 6 hours with food as needed for pain/inflammation    Take Flexeril as needed for muscle spasms    Apply ice/heat, whichever feels better to the area for muscular inflammation/spasm

## 2023-11-12 NOTE — ED PROVIDER NOTES
"ED Provider Note    CHIEF COMPLAINT  Chief Complaint   Patient presents with    Neck Pain     Neck pain radiating to the left shoulder and into her back. Pt reports she has taken tylenol at home with no relief. Pt thought she first slept wrong, but the pain keeps increasing. Pt reports the pain started 3 days ago. Pt having difficulty moving her neck due to the pain, however pt is able to have ROM. Denies Trauma. GCS 15.        EXTERNAL RECORDS REVIEWED  Other none    HPI/ROS  LIMITATION TO HISTORY   Select: : None    OUTSIDE HISTORIAN(S):  none    Marian Dee is a 27 y.o. female who presents for evaluation of left-sided neck pain.    Patient states that she woke up 3 days ago with minor stiffness in the left side of her neck.  This has gradually increased and she feels it pulling when she turns her head to the right.  She has been taking Tylenol without relief.  She states the pain radiates into her shoulder (pointing to her trapezius.)  She denies fever, chills, weakness, numbness, trauma, chest pain, shortness of breath.  She states \"I thought I just slept wrong.\"    PAST MEDICAL HISTORY   has a past medical history of History of hepatitis B.    SURGICAL HISTORY  patient denies any surgical history    FAMILY HISTORY  Family History   Problem Relation Age of Onset    No Known Problems Mother     No Known Problems Father     Hyperlipidemia Maternal Grandmother     Hyperlipidemia Paternal Grandmother     Diabetes Paternal Grandmother     Hyperlipidemia Paternal Grandfather     Heart Disease Neg Hx     Stroke Neg Hx        SOCIAL HISTORY  Social History     Tobacco Use    Smoking status: Never    Smokeless tobacco: Never   Vaping Use    Vaping Use: Never used   Substance and Sexual Activity    Alcohol use: No    Drug use: No    Sexual activity: Yes     Partners: Male     Comment: no birth control       CURRENT MEDICATIONS  Home Medications       Reviewed by Lary Sena R.N. (Registered " Nurse) on 11/12/23 at 0628  Med List Status: Partial     Medication Last Dose Status   albuterol 108 (90 Base) MCG/ACT Aero Soln inhalation aerosol  Active   lidocaine (XYLOCAINE) 2 % Solution  Active                    ALLERGIES  No Known Allergies    PHYSICAL EXAM  VITAL SIGNS: /73   Pulse 85   Temp 36.5 °C (97.7 °F) (Temporal)   Resp 18   Ht 1.524 m (5')   Wt 69.9 kg (154 lb 1.6 oz)   SpO2 100%   BMI 30.10 kg/m²    General:  WDWN female, nontoxic appearing in NAD; A+Ox3; V/S as above; afebrile; patient able to remove her sweatshirt/hoodie without assistance or apparent sig discomfort  Skin: warm and dry; good color; no rash  HEENT: NCAT; EOMs intact; PERRL; no scleral icterus   Neck: FROM but reports feeling a pulling/tightness in the left trapezius and left paraspinal areas with turning her head to the right; no LAD, no stridor; no midline bony point tenderness or step-offs; no erythema, no warmth, no crepitus in the paraspinal, scapular or trapezius areas  Extremities: FULLER x 4; no e/o trauma; radial pulse 2+; no upper extremity edema  Neurologic: CNs III-XII grossly intact; speech clear; distal sensation intact; strength 5/5 UE; gait steady  Psychiatric: Appropriate affect, normal mood      DIAGNOSTIC STUDIES / PROCEDURES  None    COURSE & MEDICAL DECISION MAKING    ED Observation Status? No; Patient does not meet criteria for ED Observation.     INITIAL ASSESSMENT, COURSE AND PLAN  Care Narrative: This is a 27-year-old who presents for left-sided neck and shoulder pain.  The shoulder joint itself is not involved.  There is no reported trauma.  She has no neurologic symptoms.  She is neurovascularly intact.  I suspect a pulled muscle which is what the patient also suspected.  I do not suspect epidural abscess or a deep soft tissue infection at this time.  Patient with some paraspinal spasm.       DISPOSITION AND DISCUSSIONS  Escalation of care considered, and ultimately not performed:blood analysis  and diagnostic imaging    Barriers to care at this time, including but not limited to:  none .     FINAL DIAGNOSIS  1. Acute neck pain           Electronically signed by: Maritza Omalley M.D., 11/12/2023 7:22 AM

## 2023-11-12 NOTE — ED NOTES
Pt d/c home ambulatory.. Pt given d/c instructions and signed d/c paper. Pt educated on follow up plan and medication usage, pt verbalized understanding of d/c instructions. PT vs stable. Pt had all belongings at d/c.

## 2023-11-12 NOTE — ED TRIAGE NOTES
Chief Complaint   Patient presents with    Neck Pain     Neck pain radiating to the left shoulder and into her back. Pt reports she has taken tylenol at home with no relief. Pt thought she first slept wrong, but the pain keeps increasing. Pt reports the pain started 3 days ago. Pt having difficulty moving her neck due to the pain, however pt is able to have ROM. Denies Trauma. GCS 15.        26 yo F ambulatory to triage for above complaint. Pt reports the pain to be a 9/10.    Pt is alert and oriented, speaking in full sentences, follows commands and responds appropriately to questions. Resp are even and unlabored.      Pt placed in lobby. Pt educated on triage process. Pt encouraged to alert staff for any changes.     Patient and staff wearing appropriate PPE    /73   Pulse 85   Temp 36.5 °C (97.7 °F) (Temporal)   Resp 18   Ht 1.524 m (5')   Wt 69.9 kg (154 lb 1.6 oz)   SpO2 100%   BMI 30.10 kg/m²

## 2024-03-18 ENCOUNTER — HOSPITAL ENCOUNTER (OUTPATIENT)
Facility: MEDICAL CENTER | Age: 29
End: 2024-03-18
Attending: FAMILY MEDICINE
Payer: COMMERCIAL

## 2024-03-18 ENCOUNTER — OFFICE VISIT (OUTPATIENT)
Dept: MEDICAL GROUP | Facility: MEDICAL CENTER | Age: 29
End: 2024-03-18
Payer: COMMERCIAL

## 2024-03-18 VITALS
HEART RATE: 78 BPM | SYSTOLIC BLOOD PRESSURE: 122 MMHG | BODY MASS INDEX: 31.41 KG/M2 | OXYGEN SATURATION: 96 % | TEMPERATURE: 98 F | HEIGHT: 60 IN | WEIGHT: 160 LBS | DIASTOLIC BLOOD PRESSURE: 65 MMHG | RESPIRATION RATE: 18 BRPM

## 2024-03-18 DIAGNOSIS — E66.9 OBESITY, CLASS I, BMI 30-34.9: ICD-10-CM

## 2024-03-18 DIAGNOSIS — Z23 NEED FOR IMMUNIZATION AGAINST INFLUENZA: ICD-10-CM

## 2024-03-18 DIAGNOSIS — Z01.419 WELL WOMAN EXAM WITH ROUTINE GYNECOLOGICAL EXAM: ICD-10-CM

## 2024-03-18 DIAGNOSIS — Z00.00 LABORATORY EXAMINATION ORDERED AS PART OF A ROUTINE GENERAL MEDICAL EXAMINATION: ICD-10-CM

## 2024-03-18 DIAGNOSIS — Z87.09 HISTORY OF STREP PHARYNGITIS: ICD-10-CM

## 2024-03-18 PROBLEM — E66.811 OBESITY, CLASS I, BMI 30-34.9: Status: ACTIVE | Noted: 2024-03-18

## 2024-03-18 PROCEDURE — 90471 IMMUNIZATION ADMIN: CPT | Performed by: FAMILY MEDICINE

## 2024-03-18 PROCEDURE — 87624 HPV HI-RISK TYP POOLED RSLT: CPT

## 2024-03-18 PROCEDURE — 90686 IIV4 VACC NO PRSV 0.5 ML IM: CPT | Performed by: FAMILY MEDICINE

## 2024-03-18 PROCEDURE — 88175 CYTOPATH C/V AUTO FLUID REDO: CPT

## 2024-03-18 PROCEDURE — 3078F DIAST BP <80 MM HG: CPT | Performed by: FAMILY MEDICINE

## 2024-03-18 PROCEDURE — 3074F SYST BP LT 130 MM HG: CPT | Performed by: FAMILY MEDICINE

## 2024-03-18 PROCEDURE — 99395 PREV VISIT EST AGE 18-39: CPT | Mod: 25 | Performed by: FAMILY MEDICINE

## 2024-03-18 RX ORDER — AMOXICILLIN 875 MG/1
875 TABLET, COATED ORAL 2 TIMES DAILY
Qty: 10 TABLET | Refills: 0 | Status: SHIPPED | OUTPATIENT
Start: 2024-03-18 | End: 2024-03-23

## 2024-03-18 ASSESSMENT — ENCOUNTER SYMPTOMS
FEVER: 0
WEIGHT LOSS: 0
WHEEZING: 0
DOUBLE VISION: 0
DIARRHEA: 0
NECK PAIN: 0
VOMITING: 0
BLURRED VISION: 0
HEARTBURN: 0
NERVOUS/ANXIOUS: 0
SHORTNESS OF BREATH: 0
INSOMNIA: 0
PALPITATIONS: 0
DIZZINESS: 0
DIAPHORESIS: 0
TREMORS: 0
SORE THROAT: 0
MEMORY LOSS: 0
COUGH: 0
SPUTUM PRODUCTION: 0
BACK PAIN: 1
FOCAL WEAKNESS: 0
BRUISES/BLEEDS EASILY: 0
ABDOMINAL PAIN: 0
SPEECH CHANGE: 0
CHILLS: 0
SENSORY CHANGE: 0
WEAKNESS: 0
NAUSEA: 0
ORTHOPNEA: 0
BLOOD IN STOOL: 0
MYALGIAS: 0
TINGLING: 0
DEPRESSION: 0
HALLUCINATIONS: 0
HEADACHES: 0

## 2024-03-18 ASSESSMENT — LIFESTYLE VARIABLES: SUBSTANCE_ABUSE: 0

## 2024-03-18 ASSESSMENT — FIBROSIS 4 INDEX: FIB4 SCORE: 0.51

## 2024-03-18 ASSESSMENT — PATIENT HEALTH QUESTIONNAIRE - PHQ9: CLINICAL INTERPRETATION OF PHQ2 SCORE: 0

## 2024-03-18 NOTE — PROGRESS NOTES
Subjective     Marian Dee is a 28 y.o. female who presents with Gynecologic Exam        She is here for her well woman examination with pap.    HPI     has a past medical history of History of hepatitis B and Obesity, Class I, BMI 30-34.9 (03/18/2024).   has no past surgical history on file.  family history includes Diabetes in her paternal grandmother; Hyperlipidemia in her maternal grandmother, paternal grandfather, and paternal grandmother; No Known Problems in her father and mother.   reports that she has never smoked. She has never used smokeless tobacco. She reports that she does not drink alcohol and does not use drugs.      Current Outpatient Medications:     albuterol 108 (90 Base) MCG/ACT Aero Soln inhalation aerosol, Inhale 1-2 Puffs every four hours as needed for Shortness of Breath., Disp: 8 g, Rfl: 3  has No Known Allergies.    Review of Systems   Constitutional:  Negative for chills, diaphoresis, fever, malaise/fatigue and weight loss.   HENT:  Negative for congestion, hearing loss, sore throat and tinnitus.    Eyes:  Negative for blurred vision and double vision.   Respiratory:  Negative for cough, sputum production, shortness of breath and wheezing.         Wheezes only when she has a cold.  Has an inhaler.   Cardiovascular:  Negative for chest pain, palpitations, orthopnea and leg swelling.   Gastrointestinal:  Negative for abdominal pain, blood in stool, diarrhea, heartburn, nausea and vomiting.   Genitourinary:  Negative for dysuria, frequency, hematuria and urgency.   Musculoskeletal:  Positive for back pain. Negative for joint pain, myalgias and neck pain.        Occasional back stiffness with cold.   Skin:  Negative for rash.   Neurological:  Negative for dizziness, tingling, tremors, sensory change, speech change, focal weakness, weakness and headaches.   Endo/Heme/Allergies:  Positive for environmental allergies. Does not bruise/bleed easily.   Psychiatric/Behavioral:   Negative for depression, hallucinations, memory loss, substance abuse and suicidal ideas. The patient is not nervous/anxious and does not have insomnia.           Objective     /65   Pulse 78   Temp 36.7 °C (98 °F)   Resp 18   Ht 1.524 m (5')   Wt 72.6 kg (160 lb)   SpO2 96%   BMI 31.25 kg/m²      Physical Exam  Vitals reviewed.   Constitutional:       General: She is not in acute distress.     Appearance: She is well-developed.   HENT:      Head: Normocephalic and atraumatic.   Eyes:      General:         Left eye: No discharge.      Pupils: Pupils are equal, round, and reactive to light.   Neck:      Thyroid: No thyromegaly.      Vascular: No JVD.   Cardiovascular:      Rate and Rhythm: Normal rate and regular rhythm.      Heart sounds: Normal heart sounds. No murmur heard.  Pulmonary:      Effort: Pulmonary effort is normal. No respiratory distress.      Breath sounds: Normal breath sounds. No wheezing or rales.   Chest:   Breasts:     Breasts are symmetrical.      Right: No inverted nipple, mass, nipple discharge, skin change or tenderness.      Left: No inverted nipple, mass, nipple discharge, skin change or tenderness.   Abdominal:      General: Bowel sounds are normal. There is no distension.      Palpations: Abdomen is soft. There is no mass.      Tenderness: There is no abdominal tenderness.   Genitourinary:     Vagina: Normal. No vaginal discharge.      Cervix: No discharge.      Adnexa:         Right: No mass.          Left: No mass.        Comments: Pap smear (brush and spatula) of cervix taken and sent  Musculoskeletal:         General: Normal range of motion.      Cervical back: Normal range of motion and neck supple.   Lymphadenopathy:      Cervical: No cervical adenopathy.   Skin:     General: Skin is warm and dry.      Findings: No erythema or rash.   Neurological:      General: No focal deficit present.      Mental Status: She is alert and oriented to person, place, and time.       Coordination: Coordination normal.   Psychiatric:         Mood and Affect: Mood normal.         Behavior: Behavior normal.              Anticipatory guidance: seatbelt worn, yearly preventive examinations recommended.  Immunizations discussed. Tetanus due 2029.  Mammogram recommended at 40.  Colonoscopy recommended at 45.  Discussed COVID vaccine.  Has been exposed and doing well.  Walking more for exercise.      Assessment & Plan        1. Well woman exam with routine gynecological exam  She is generally well.  Await pap.  - THINPREP PAP WITH HPV; Future    2. Laboratory examination ordered as part of a routine general medical examination  Routine orders discussed and placed  - Comp Metabolic Panel; Future  - CBC WITHOUT DIFFERENTIAL; Future  - TSH; Future  - Lipid Profile; Future    3. Obesity, Class I, BMI 30-34.9  Discussed, has been having to eat out with remodel.  Getting back on track.  - Patient identified as having weight management issue.  Appropriate orders and counseling given.    4. Need for immunization against influenza  Administered today, right deltoid  - INFLUENZA VACCINE QUAD INJ (PF)    5. History of strep pharyngitis  No current symptoms.  Gets frequently.    - amoxicillin (AMOXIL) 875 MG tablet; Take 1 Tablet by mouth 2 times a day for 5 days.  Dispense: 10 Tablet; Refill: 0       Recheck one year, sooner as needed

## 2024-03-21 LAB
CYTOLOGIST CVX/VAG CYTO: NORMAL
CYTOLOGY CVX/VAG DOC CYTO: NORMAL
CYTOLOGY CVX/VAG DOC THIN PREP: NORMAL
HPV I/H RISK 4 DNA CVX QL PROBE+SIG AMP: NEGATIVE
NOTE NL11727A: NORMAL
OTHER STN SPEC: NORMAL
STAT OF ADQ CVX/VAG CYTO-IMP: NORMAL

## 2024-09-18 NOTE — PROGRESS NOTES
Pt here today for OB follow up  Pt states no complaints  Reports +FM  Good # verified  Pharmacy Confirmed             
S) Pt is a 23 y.o.   at 22w0d  gestation. Routine prenatal care today. No complaints today. Reviewed labs and US results. Glucose testing next appointment.  labor precautions discussed, all questions answered.    Fetal movement Normal  Cramping no  VB no  LOF no   Denies dysuria. Generally feels well today. Good self-care activities identified. Denies headaches, swelling, visual changes, or epigastric pain .     O) /62   Wt 64 kg (141 lb)         Labs:       PNL: WNL       GCT: Too early        AFP: normal       GBS: N/A       Pertinent ultrasound -        5/3/19- Survey WNL, ALEX 12.5cm, c/w prev dating.    A) IUP at 22w0d       S=D         Patient Active Problem List    Diagnosis Date Noted   • Supervision of other normal pregnancy, antepartum 2019          SVE: deferred       Chaperone offered: n/a         TDAP: no       FLU: yes        BTL: no       : n/a       C/S Consent: n/a       IOL or C/S scheduled: no       LAST PAP: 19- Negative         P) s/s ptl vs general discomforts. Fetal movements reviewed. General ed and anticipatory guidance. Nutrition/exercise/vitamin. Plans breast Plans pp contraception- unsure  Continue PNV.   
range of motion is not limited and there is no muscle tenderness.

## 2024-10-28 DIAGNOSIS — R10.11 RIGHT UPPER QUADRANT ABDOMINAL PAIN: ICD-10-CM

## 2024-10-28 DIAGNOSIS — Z87.19 PERSONAL HISTORY OF GALLSTONES: ICD-10-CM

## 2024-10-30 ENCOUNTER — HOSPITAL ENCOUNTER (OUTPATIENT)
Dept: RADIOLOGY | Facility: MEDICAL CENTER | Age: 29
End: 2024-10-30
Attending: FAMILY MEDICINE
Payer: COMMERCIAL

## 2024-10-30 DIAGNOSIS — Z87.19 PERSONAL HISTORY OF GALLSTONES: ICD-10-CM

## 2024-10-30 DIAGNOSIS — R10.11 RIGHT UPPER QUADRANT ABDOMINAL PAIN: ICD-10-CM

## 2024-10-30 PROCEDURE — 76705 ECHO EXAM OF ABDOMEN: CPT

## 2024-11-04 ENCOUNTER — APPOINTMENT (OUTPATIENT)
Dept: MEDICAL GROUP | Facility: MEDICAL CENTER | Age: 29
End: 2024-11-04
Payer: COMMERCIAL

## 2024-11-04 ENCOUNTER — OFFICE VISIT (OUTPATIENT)
Dept: MEDICAL GROUP | Facility: MEDICAL CENTER | Age: 29
End: 2024-11-04
Payer: COMMERCIAL

## 2024-11-04 VITALS
HEIGHT: 60 IN | WEIGHT: 153 LBS | HEART RATE: 79 BPM | DIASTOLIC BLOOD PRESSURE: 60 MMHG | TEMPERATURE: 98.7 F | OXYGEN SATURATION: 96 % | SYSTOLIC BLOOD PRESSURE: 110 MMHG | RESPIRATION RATE: 16 BRPM | BODY MASS INDEX: 30.04 KG/M2

## 2024-11-04 DIAGNOSIS — K80.20 CALCULUS OF GALLBLADDER WITHOUT CHOLECYSTITIS WITHOUT OBSTRUCTION: ICD-10-CM

## 2024-11-04 DIAGNOSIS — R10.11 COLICKY RIGHT UPPER QUADRANT PAIN: ICD-10-CM

## 2024-11-04 DIAGNOSIS — Z23 NEED FOR VACCINATION: ICD-10-CM

## 2024-11-04 PROCEDURE — 99213 OFFICE O/P EST LOW 20 MIN: CPT | Mod: 25 | Performed by: FAMILY MEDICINE

## 2024-11-04 PROCEDURE — 90471 IMMUNIZATION ADMIN: CPT

## 2024-11-04 PROCEDURE — 90656 IIV3 VACC NO PRSV 0.5 ML IM: CPT

## 2024-11-04 ASSESSMENT — FIBROSIS 4 INDEX: FIB4 SCORE: 0.51

## 2024-11-04 NOTE — PROGRESS NOTES
Chief Complaint   Patient presents with    Follow-Up     [facility]-RUQ on 10-31-24    Cholelithiasis       Subjective:     HPI:   Marian Dunaway presents today with the followin. Colicky right upper quadrant pain  Had another episode of right upper quadrant pain.  Last one was 9 years ago.  Feeling okay overall.  The episode lasted around a half hour.  She will continue her good diet.  Will avoid heavy fatty or fried food.  She will get her lab orders completed.    2. Calculus of gallbladder without cholecystitis without obstruction  US shows multiple stones, no other abnormality.      3. Need for vaccination  Flu shot administered today.        Patient Active Problem List    Diagnosis Date Noted    Colicky right upper quadrant pain 2024    Calculus of gallbladder without cholecystitis without obstruction 2024    Personal history of gallstones 10/28/2024    Obesity, Class I, BMI 30-34.9 2024       Current medicines (including changes today)  Current Outpatient Medications   Medication Sig Dispense Refill    albuterol 108 (90 Base) MCG/ACT Aero Soln inhalation aerosol Inhale 1-2 Puffs every four hours as needed for Shortness of Breath. 8 g 3     No current facility-administered medications for this visit.       No Known Allergies    ROS: As per HPI       Objective:     /60   Pulse 79   Temp 37.1 °C (98.7 °F)   Resp 16   Ht 1.524 m (5')   Wt 69.4 kg (153 lb)   SpO2 96%  Body mass index is 29.88 kg/m².    Physical Exam:  Constitutional: Well-developed and well-nourished. Not diaphoretic. No distress. Lucid and fluent.  Skin: Skin is warm and dry. No rash noted.  Head: Atraumatic without lesions.  Eyes: Conjunctivae and extraocular motions are normal. Pupils are equal, round, and reactive to light. No scleral icterus.   Ears:  External ears unremarkable.   Neck: Supple, trachea midline. No thyromegaly present. No cervical or supraclavicular lymphadenopathy. No JVD or carotid  bruits appreciated  Cardiovascular: Regular rate and rhythm.  Normal S1, S2 without murmur appreciated.  Chest: Effort normal. Clear to auscultation throughout. No adventitious sounds.   Abdomen: Soft, non tender, and without distention. Active bowel sounds in all four quadrants. No rebound, guarding, masses or hepatosplenomegaly. Benign examination.    Extremities: No cyanosis, clubbing, erythema, nor edema.   Neurological: Alert and oriented x 3. No tremor.  Psychiatric:  Behavior, mood, and affect are appropriate.    US result from last week reviewed and discussed.  Normal other than small stones.        Assessment and Plan:     28 y.o. female with the following issues:    1. Colicky right upper quadrant pain        2. Calculus of gallbladder without cholecystitis without obstruction        3. Need for vaccination  INFLUENZA VACCINE TRI INJ (PF)             Followup: Return in about 6 months (around 5/4/2025), or if symptoms worsen or fail to improve.   Graft Donor Site Bandage (Optional-Leave Blank If You Don't Want In Note): Steri-strips and a pressure bandage were applied to the donor site.

## 2025-04-25 ENCOUNTER — APPOINTMENT (OUTPATIENT)
Dept: DERMATOLOGY | Facility: IMAGING CENTER | Age: 30
End: 2025-04-25